# Patient Record
Sex: MALE | Race: WHITE | NOT HISPANIC OR LATINO | Employment: OTHER | URBAN - METROPOLITAN AREA
[De-identification: names, ages, dates, MRNs, and addresses within clinical notes are randomized per-mention and may not be internally consistent; named-entity substitution may affect disease eponyms.]

---

## 2019-10-23 ENCOUNTER — HOSPITAL ENCOUNTER (EMERGENCY)
Facility: HOSPITAL | Age: 64
Discharge: HOME | End: 2019-10-23
Attending: EMERGENCY MEDICINE | Admitting: EMERGENCY MEDICINE

## 2019-10-23 ENCOUNTER — APPOINTMENT (OUTPATIENT)
Dept: CT IMAGING | Facility: HOSPITAL | Age: 64
End: 2019-10-23

## 2019-10-23 ENCOUNTER — APPOINTMENT (OUTPATIENT)
Dept: GENERAL RADIOLOGY | Facility: HOSPITAL | Age: 64
End: 2019-10-23

## 2019-10-23 VITALS
TEMPERATURE: 97.1 F | SYSTOLIC BLOOD PRESSURE: 202 MMHG | DIASTOLIC BLOOD PRESSURE: 119 MMHG | HEART RATE: 100 BPM | OXYGEN SATURATION: 97 % | HEIGHT: 72 IN | RESPIRATION RATE: 18 BRPM | BODY MASS INDEX: 18.5 KG/M2 | WEIGHT: 136.6 LBS

## 2019-10-23 DIAGNOSIS — I71.40 ABDOMINAL AORTIC ANEURYSM (AAA) 3.0 CM TO 5.5 CM IN DIAMETER IN MALE (HCC): ICD-10-CM

## 2019-10-23 DIAGNOSIS — K59.00 CONSTIPATION, UNSPECIFIED CONSTIPATION TYPE: Primary | ICD-10-CM

## 2019-10-23 DIAGNOSIS — I10 HYPERTENSION, UNSPECIFIED TYPE: ICD-10-CM

## 2019-10-23 LAB
ALBUMIN SERPL-MCNC: 3.9 G/DL (ref 3.5–5.2)
ALBUMIN/GLOB SERPL: 1.3 G/DL
ALP SERPL-CCNC: 137 U/L (ref 39–117)
ALT SERPL W P-5'-P-CCNC: 7 U/L (ref 1–41)
ANION GAP SERPL CALCULATED.3IONS-SCNC: 12.8 MMOL/L (ref 5–15)
AST SERPL-CCNC: 6 U/L (ref 1–40)
BASOPHILS # BLD AUTO: 0.01 10*3/MM3 (ref 0–0.2)
BASOPHILS NFR BLD AUTO: 0.1 % (ref 0–1.5)
BILIRUB SERPL-MCNC: 0.4 MG/DL (ref 0.2–1.2)
BILIRUB UR QL STRIP: NEGATIVE
BUN BLD-MCNC: 16 MG/DL (ref 8–23)
BUN/CREAT SERPL: 16.5 (ref 7–25)
CALCIUM SPEC-SCNC: 8.9 MG/DL (ref 8.6–10.5)
CHLORIDE SERPL-SCNC: 100 MMOL/L (ref 98–107)
CLARITY UR: CLEAR
CO2 SERPL-SCNC: 23.2 MMOL/L (ref 22–29)
COLOR UR: YELLOW
CREAT BLD-MCNC: 0.97 MG/DL (ref 0.76–1.27)
D-LACTATE SERPL-SCNC: 1.9 MMOL/L (ref 0.5–2)
DEPRECATED RDW RBC AUTO: 43 FL (ref 37–54)
EOSINOPHIL # BLD AUTO: 0 10*3/MM3 (ref 0–0.4)
EOSINOPHIL NFR BLD AUTO: 0 % (ref 0.3–6.2)
ERYTHROCYTE [DISTWIDTH] IN BLOOD BY AUTOMATED COUNT: 14.5 % (ref 12.3–15.4)
GFR SERPL CREATININE-BSD FRML MDRD: 77 ML/MIN/1.73
GLOBULIN UR ELPH-MCNC: 3 GM/DL
GLUCOSE BLD-MCNC: 110 MG/DL (ref 65–99)
GLUCOSE UR STRIP-MCNC: NEGATIVE MG/DL
HCT VFR BLD AUTO: 49.1 % (ref 37.5–51)
HGB BLD-MCNC: 16.3 G/DL (ref 13–17.7)
HGB UR QL STRIP.AUTO: NEGATIVE
IMM GRANULOCYTES # BLD AUTO: 0.03 10*3/MM3 (ref 0–0.05)
IMM GRANULOCYTES NFR BLD AUTO: 0.3 % (ref 0–0.5)
KETONES UR QL STRIP: ABNORMAL
LEUKOCYTE ESTERASE UR QL STRIP.AUTO: NEGATIVE
LIPASE SERPL-CCNC: 28 U/L (ref 13–60)
LYMPHOCYTES # BLD AUTO: 0.46 10*3/MM3 (ref 0.7–3.1)
LYMPHOCYTES NFR BLD AUTO: 5.2 % (ref 19.6–45.3)
MCH RBC QN AUTO: 27.4 PG (ref 26.6–33)
MCHC RBC AUTO-ENTMCNC: 33.2 G/DL (ref 31.5–35.7)
MCV RBC AUTO: 82.7 FL (ref 79–97)
MONOCYTES # BLD AUTO: 0.37 10*3/MM3 (ref 0.1–0.9)
MONOCYTES NFR BLD AUTO: 4.2 % (ref 5–12)
NEUTROPHILS # BLD AUTO: 7.97 10*3/MM3 (ref 1.7–7)
NEUTROPHILS NFR BLD AUTO: 90.2 % (ref 42.7–76)
NITRITE UR QL STRIP: NEGATIVE
NRBC BLD AUTO-RTO: 0 /100 WBC (ref 0–0.2)
PH UR STRIP.AUTO: 5.5 [PH] (ref 5–8)
PLATELET # BLD AUTO: 183 10*3/MM3 (ref 140–450)
PMV BLD AUTO: 10.4 FL (ref 6–12)
POTASSIUM BLD-SCNC: 4.6 MMOL/L (ref 3.5–5.2)
PROT SERPL-MCNC: 6.9 G/DL (ref 6–8.5)
PROT UR QL STRIP: NEGATIVE
RBC # BLD AUTO: 5.94 10*6/MM3 (ref 4.14–5.8)
SODIUM BLD-SCNC: 136 MMOL/L (ref 136–145)
SP GR UR STRIP: >=1.03 (ref 1–1.03)
UROBILINOGEN UR QL STRIP: ABNORMAL
WBC NRBC COR # BLD: 8.84 10*3/MM3 (ref 3.4–10.8)

## 2019-10-23 PROCEDURE — 96374 THER/PROPH/DIAG INJ IV PUSH: CPT

## 2019-10-23 PROCEDURE — 99284 EMERGENCY DEPT VISIT MOD MDM: CPT

## 2019-10-23 PROCEDURE — 74018 RADEX ABDOMEN 1 VIEW: CPT

## 2019-10-23 PROCEDURE — 25010000002 IOPAMIDOL 61 % SOLUTION: Performed by: EMERGENCY MEDICINE

## 2019-10-23 PROCEDURE — 74177 CT ABD & PELVIS W/CONTRAST: CPT

## 2019-10-23 PROCEDURE — 96375 TX/PRO/DX INJ NEW DRUG ADDON: CPT

## 2019-10-23 PROCEDURE — 81003 URINALYSIS AUTO W/O SCOPE: CPT | Performed by: PHYSICIAN ASSISTANT

## 2019-10-23 PROCEDURE — 83605 ASSAY OF LACTIC ACID: CPT | Performed by: PHYSICIAN ASSISTANT

## 2019-10-23 PROCEDURE — 80053 COMPREHEN METABOLIC PANEL: CPT | Performed by: PHYSICIAN ASSISTANT

## 2019-10-23 PROCEDURE — 83690 ASSAY OF LIPASE: CPT | Performed by: PHYSICIAN ASSISTANT

## 2019-10-23 PROCEDURE — 96361 HYDRATE IV INFUSION ADD-ON: CPT

## 2019-10-23 PROCEDURE — 85025 COMPLETE CBC W/AUTO DIFF WBC: CPT | Performed by: PHYSICIAN ASSISTANT

## 2019-10-23 RX ORDER — SODIUM CHLORIDE 9 MG/ML
125 INJECTION, SOLUTION INTRAVENOUS CONTINUOUS
Status: DISCONTINUED | OUTPATIENT
Start: 2019-10-23 | End: 2019-10-23 | Stop reason: HOSPADM

## 2019-10-23 RX ORDER — LABETALOL HYDROCHLORIDE 5 MG/ML
10 INJECTION, SOLUTION INTRAVENOUS ONCE
Status: COMPLETED | OUTPATIENT
Start: 2019-10-23 | End: 2019-10-23

## 2019-10-23 RX ORDER — POLYETHYLENE GLYCOL 3350 17 G/17G
17 POWDER, FOR SOLUTION ORAL 2 TIMES DAILY
Qty: 10 PACKET | Refills: 0 | Status: SHIPPED | OUTPATIENT
Start: 2019-10-23 | End: 2019-10-28

## 2019-10-23 RX ORDER — ONDANSETRON 4 MG/1
4 TABLET, ORALLY DISINTEGRATING ORAL 4 TIMES DAILY PRN
Qty: 28 TABLET | Refills: 0 | Status: SHIPPED | OUTPATIENT
Start: 2019-10-23 | End: 2019-10-30

## 2019-10-23 RX ORDER — AMLODIPINE BESYLATE 10 MG/1
10 TABLET ORAL DAILY
Qty: 14 TABLET | Refills: 0 | Status: SHIPPED | OUTPATIENT
Start: 2019-10-23 | End: 2019-11-05 | Stop reason: SDUPTHER

## 2019-10-23 RX ORDER — SODIUM CHLORIDE 0.9 % (FLUSH) 0.9 %
10 SYRINGE (ML) INJECTION AS NEEDED
Status: DISCONTINUED | OUTPATIENT
Start: 2019-10-23 | End: 2019-10-23 | Stop reason: HOSPADM

## 2019-10-23 RX ADMIN — LABETALOL 20 MG/4 ML (5 MG/ML) INTRAVENOUS SYRINGE 10 MG: at 15:35

## 2019-10-23 RX ADMIN — LABETALOL 20 MG/4 ML (5 MG/ML) INTRAVENOUS SYRINGE 10 MG: at 17:36

## 2019-10-23 RX ADMIN — IOPAMIDOL 85 ML: 612 INJECTION, SOLUTION INTRAVENOUS at 16:09

## 2019-10-23 RX ADMIN — SODIUM CHLORIDE 125 ML/HR: 9 INJECTION, SOLUTION INTRAVENOUS at 16:28

## 2019-10-23 NOTE — ED TRIAGE NOTES
"Pt reports \"light\" bowel movement today but otherwise has not had bowel movement x1.5 weeks.   "

## 2019-10-23 NOTE — ED PROVIDER NOTES
Pt presents to the ED c/o constipation that began 1.5 weeks ago. Pt is also c/o intermittent, crampy abdominal pain. Pt denies abdominal distension, headache, chest pain, SOA, and focal numbness or weakness. Pt reports trying both Miralax and Exlax this morning and throwing up after each. Pt reports consuming normal amounts of solids over the past 1.5 weeks. Pt states he drinks coffee throughout the day but does not consume much water. Pt reports being a smoker but denies EtOH use.     On exam, appears older then stated age, regular rate and rhythm, lungs CTAB, abdomen soft, nontender and nondistended, good bowel sound, no BLE edema, intact distal pulses    Workup reviewed. I agree with the plan to obtain CT A/P, XR abdomen and blood work for further evaluation.          1701: Spoke with  (radiology) who regarding CT A/P results (see final read)    1719: Rechecked pt who is resting comfortably and in NAD. Informed pt of all negative results, including CT A/P. Discussed plan to try Miralax for constipation. Discussed plan to discharge. RTER precautions given. Pt understands and agrees with the plan, all questions answered.        Attestation:    The MIKEY and I have discussed this patient's history, physical exam, and treatment plan.  I have reviewed the documentation and personally had a face to face interaction with the patient. I affirm the documentation and agree with the treatment and plan.  The attached note describes my personal findings.    Documentation assistance provided by christy Butler for . Information recorded by the christy was done at my direction and has been verified and validated by me.          Arleen Butler  10/23/19 1725       Matthew Artis MD  10/23/19 1931

## 2019-10-23 NOTE — DISCHARGE INSTRUCTIONS
Try taking Miralax two times a day first. If you have not had a bowel movement after a few days, increase to three times/day. Follow up with primary care for blood pressure. Follow up with Dr. Dey for abdominal aneurysm. Return to the ER with any concerning or worsening symptoms.

## 2019-10-23 NOTE — ED PROVIDER NOTES
"EMERGENCY DEPARTMENT ENCOUNTER    Room Number:  24/24  PCP: System, Provider Not In  Historian: Patient and pt's wife      HPI:  Chief Complaint: Constipation  Context: Kieran Hdez is a 64 y.o. male who presents to the ED c/o constipation that began about \"a week and a half ago\". He admits to lightheaded s/t being hungry and slight abd pain. He denies nausea. He states that he has had small BM's but has not have a nml BM in 1.5 weeks. Pt's wife states the pt did take some Miralax today but ended up vomiting it up. She states the pt also took some Ex-Lax and vomited that up this morning. He denies being on BP medication. Pt is a smoker and he sates that he may smoke a pack or less a day.    Pain Location: n/a  Radiation: n/a  Character: Constipation  Duration: week and a half  Severity: mild  Progression: unchanged  Aggravating Factors: none  Alleviating Factors: none      ALLERGIES  Patient has no known allergies.    PAST MEDICAL HISTORY  Active Ambulatory Problems     Diagnosis Date Noted   • No Active Ambulatory Problems     Resolved Ambulatory Problems     Diagnosis Date Noted   • No Resolved Ambulatory Problems     No Additional Past Medical History       PAST SURGICAL HISTORY  History reviewed. No pertinent surgical history.    FAMILY HISTORY  History reviewed. No pertinent family history.    SOCIAL HISTORY  Social History     Socioeconomic History   • Marital status:      Spouse name: Not on file   • Number of children: Not on file   • Years of education: Not on file   • Highest education level: Not on file   Tobacco Use   • Smoking status: Current Every Day Smoker     Packs/day: 1.00     Types: Cigarettes   • Smokeless tobacco: Never Used   Substance and Sexual Activity   • Alcohol use: No     Frequency: Never   • Drug use: No   • Sexual activity: Defer           REVIEW OF SYSTEMS  Review of Systems   Constitutional: Negative for activity change, appetite change and fever.   HENT: Negative for congestion " and sore throat.    Eyes: Negative.    Respiratory: Positive for wheezing (when sleeping). Negative for cough and shortness of breath.    Cardiovascular: Negative for chest pain and leg swelling.   Gastrointestinal: Positive for abdominal pain and constipation. Negative for diarrhea, nausea and vomiting.   Endocrine: Negative.    Genitourinary: Negative for decreased urine volume and dysuria.   Musculoskeletal: Negative for neck pain.   Skin: Negative for rash and wound.   Allergic/Immunologic: Negative.    Neurological: Positive for light-headedness. Negative for weakness, numbness and headaches.   Hematological: Negative.    Psychiatric/Behavioral: Negative.    All other systems reviewed and are negative.          PHYSICAL EXAM  ED Triage Vitals   Temp Heart Rate Resp BP SpO2   10/23/19 1359 10/23/19 1359 10/23/19 1359 10/23/19 1415 10/23/19 1359   97.1 °F (36.2 °C) 108 18 (!) 235/137 97 %      Temp src Heart Rate Source Patient Position BP Location FiO2 (%)   10/23/19 1359 10/23/19 1359 10/23/19 1415 10/23/19 1415 --   Tympanic Monitor Sitting Right arm      Physical Exam   Constitutional: He is oriented to person, place, and time. No distress.   HENT:   Head: Normocephalic and atraumatic.   Mouth/Throat: Mucous membranes are dry.   Eyes: EOM are normal. Pupils are equal, round, and reactive to light.   Neck: Normal range of motion. Neck supple.   Cardiovascular: Normal rate, regular rhythm and normal heart sounds.   Pulmonary/Chest: Effort normal and breath sounds normal. No respiratory distress.   Abdominal: Soft. There is no tenderness. There is no rebound and no guarding.   Musculoskeletal: Normal range of motion. He exhibits no edema.   Neurological: He is alert and oriented to person, place, and time. He has normal sensation and normal strength.   Skin: Skin is warm and dry.   Psychiatric: Mood and affect normal.   Nursing note and vitals reviewed.          LAB RESULTS  Recent Results (from the past 24  hour(s))   Comprehensive Metabolic Panel    Collection Time: 10/23/19  3:00 PM   Result Value Ref Range    Glucose 110 (H) 65 - 99 mg/dL    BUN 16 8 - 23 mg/dL    Creatinine 0.97 0.76 - 1.27 mg/dL    Sodium 136 136 - 145 mmol/L    Potassium 4.6 3.5 - 5.2 mmol/L    Chloride 100 98 - 107 mmol/L    CO2 23.2 22.0 - 29.0 mmol/L    Calcium 8.9 8.6 - 10.5 mg/dL    Total Protein 6.9 6.0 - 8.5 g/dL    Albumin 3.90 3.50 - 5.20 g/dL    ALT (SGPT) 7 1 - 41 U/L    AST (SGOT) 6 1 - 40 U/L    Alkaline Phosphatase 137 (H) 39 - 117 U/L    Total Bilirubin 0.4 0.2 - 1.2 mg/dL    eGFR Non African Amer 77 >60 mL/min/1.73    Globulin 3.0 gm/dL    A/G Ratio 1.3 g/dL    BUN/Creatinine Ratio 16.5 7.0 - 25.0    Anion Gap 12.8 5.0 - 15.0 mmol/L   Lipase    Collection Time: 10/23/19  3:00 PM   Result Value Ref Range    Lipase 28 13 - 60 U/L   CBC Auto Differential    Collection Time: 10/23/19  3:00 PM   Result Value Ref Range    WBC 8.84 3.40 - 10.80 10*3/mm3    RBC 5.94 (H) 4.14 - 5.80 10*6/mm3    Hemoglobin 16.3 13.0 - 17.7 g/dL    Hematocrit 49.1 37.5 - 51.0 %    MCV 82.7 79.0 - 97.0 fL    MCH 27.4 26.6 - 33.0 pg    MCHC 33.2 31.5 - 35.7 g/dL    RDW 14.5 12.3 - 15.4 %    RDW-SD 43.0 37.0 - 54.0 fl    MPV 10.4 6.0 - 12.0 fL    Platelets 183 140 - 450 10*3/mm3    Neutrophil % 90.2 (H) 42.7 - 76.0 %    Lymphocyte % 5.2 (L) 19.6 - 45.3 %    Monocyte % 4.2 (L) 5.0 - 12.0 %    Eosinophil % 0.0 (L) 0.3 - 6.2 %    Basophil % 0.1 0.0 - 1.5 %    Immature Grans % 0.3 0.0 - 0.5 %    Neutrophils, Absolute 7.97 (H) 1.70 - 7.00 10*3/mm3    Lymphocytes, Absolute 0.46 (L) 0.70 - 3.10 10*3/mm3    Monocytes, Absolute 0.37 0.10 - 0.90 10*3/mm3    Eosinophils, Absolute 0.00 0.00 - 0.40 10*3/mm3    Basophils, Absolute 0.01 0.00 - 0.20 10*3/mm3    Immature Grans, Absolute 0.03 0.00 - 0.05 10*3/mm3    nRBC 0.0 0.0 - 0.2 /100 WBC   Lactic Acid, Plasma    Collection Time: 10/23/19  3:50 PM   Result Value Ref Range    Lactate 1.9 0.5 - 2.0 mmol/L   Urinalysis With  Microscopic If Indicated (No Culture) - Urine, Clean Catch    Collection Time: 10/23/19  4:34 PM   Result Value Ref Range    Color, UA Yellow Yellow, Straw    Appearance, UA Clear Clear    pH, UA 5.5 5.0 - 8.0    Specific Gravity, UA >=1.030 1.005 - 1.030    Glucose, UA Negative Negative    Ketones, UA 15 mg/dL (1+) (A) Negative    Bilirubin, UA Negative Negative    Blood, UA Negative Negative    Protein, UA Negative Negative    Leuk Esterase, UA Negative Negative    Nitrite, UA Negative Negative    Urobilinogen, UA 0.2 E.U./dL 0.2 - 1.0 E.U./dL       I ordered the above labs and reviewed the results        RADIOLOGY  Ct Abdomen Pelvis With Contrast    Result Date: 10/23/2019  Narrative: CT ABDOMEN PELVIS W CONTRAST-  INDICATIONS: Abdominal pain  TECHNIQUE: Radiation dose reduction techniques were utilized, including automated exposure control and exposure modulation based on body size. Enhanced ABDOMEN AND PELVIS CT  COMPARISON: 01/18/2005  FINDINGS:  A hepatic cyst is demonstrated. A left hepatic subcentimeter low density too small to characterize, interval follow-up recommended characterize change.  Areas of cortical thinning in the bilateral renal cortices may be result of prior infarcts or infections.  Mild nonspecific thickening of the adrenal glands.  Otherwise unremarkable appearance of the liver, spleen, adrenal glands, pancreas, kidneys, bladder.  No bowel obstruction or abnormal bowel thickening is identified. Appendix does not appear inflamed. Mild colonic fecal retention.  No free intraperitoneal gas or free fluid.  Scattered small mesenteric and para-aortic lymph nodes are seen that are not significant by size criteria.  Abdominal aorta is aneurysmal, 3.6 x 3.8 cm on image 46 of series 1 (previously 2.0 cm), with mural plaque. Aortic and other arterial calcifications are present.  The lung bases show pleural-based densities of the right lower lobe that may be focal atelectasis or pulmonary nodules, 4  mm on image 5 of series 1, 1.1 cm on image 3, recommend three-month follow-up chest CT characterize change (the larger focus could be further evaluated with positron emission tomography if indicated).  Degenerative changes are seen in the spine. No acute fracture is identified.        Impression:   1. No acute inflammatory process of bowel is identified, follow up as indications persist. 2. No obstructive uropathy. Subcentimeter hepatic low density too small to characterize. 3. Abdominal aortic aneurysm. 4. Pleural-based nodular densities of the right lower lobe, follow-up recommended.  Discussed by telephone with Dr. Artis at 1700, 10/23/2019.  This report was finalized on 10/23/2019 5:01 PM by Dr. Dk Lima M.D.      Xr Abdomen Kub    Result Date: 10/23/2019  Narrative: XR ABDOMEN KUB-  INDICATIONS: Abdominal pain  TECHNIQUE: Supine views of the abdomen  COMPARISON: None available  FINDINGS:   The bowel gas pattern is nonobstructive. No supine evidence for free intraperitoneal gas. Moderate colonic fecal retention, kidneys are partly obscured. No definite nephrolithiasis. Nonspecific pelvic soft tissue calcifications. If there is further clinical concern, CT can be obtained for further examination.      Impression:  As described.  This report was finalized on 10/23/2019 3:47 PM by Dr. Dk Lima M.D.        I ordered the above noted radiological studies and reviewed the images on the PACS system.         MEDICATIONS GIVEN IN ER  Medications   labetalol (NORMODYNE,TRANDATE) injection 10 mg (10 mg Intravenous Given 10/23/19 1535)   iopamidol (ISOVUE-300) 61 % injection 100 mL (85 mL Intravenous Given by Other 10/23/19 1609)   labetalol (NORMODYNE,TRANDATE) injection 10 mg (10 mg Intravenous Given 10/23/19 1736)         PROCEDURES  Procedures      PROGRESS AND CONSULTS     1540- Reviewed pt's history and workup with Dr. Artis (ER physician). After a bedside evaluation, Dr. Artis agrees with  "the plan of care.    1711- Rechecked pt who is resting comfortably in NAD. Informed pt of the lab and imaging results. D/w pt the plan to DC with instructions to f/u with vascular surgery. Informed the pt that BP medication will be prescribed but f/u with a PCP will be necessary. Pt understands and agrees with plan. All questions answered.              Disposition vitals:  BP (!) 202/119   Pulse 100   Temp 97.1 °F (36.2 °C) (Tympanic)   Resp 18   Ht 182.9 cm (72\")   Wt 62 kg (136 lb 9.6 oz)   SpO2 97%   BMI 18.53 kg/m²           DIAGNOSIS  Final diagnoses:   Constipation, unspecified constipation type   Hypertension, unspecified type   Abdominal aortic aneurysm (AAA) 3.0 cm to 5.5 cm in diameter in male (CMS/HCC)           DISPOSITION  DISCHARGE    Patient discharged in stable condition.    Reviewed implications of results, diagnosis, meds, responsibility to follow up, warning signs and symptoms of possible worsening, potential complications and reasons to return to ER.    Patient/Family voiced understanding of above instructions.    Discussed plan for discharge, as there is no emergent indication for admission. Patient referred to primary care provider for BP management due to today's BP. Pt/family is agreeable and understands need for follow up and repeat testing.  Pt is aware that discharge does not mean that nothing is wrong but it indicates no emergency is present that requires admission and they must continue care with follow-up as given below or physician of their choice.     FOLLOW-UP  PATIENT LIAISON [facility] [id number] [phone]  Call in 1 day  To establish primary care and for high blood pressure    Andrei De La Garza MD  [address]  [place] [id number] [phone]    Call in 2 days  For follow up on abdominal aortic aneursym    [facility]  [address] [address] [phone]    As needed, If " symptoms worsen         Medication List      New Prescriptions    amLODIPine 10 MG tablet  Commonly known as:  NORVASC  Take 1 tablet by mouth Daily for 14 days.     ondansetron ODT 4 MG disintegrating tablet  Commonly known as:  ZOFRAN-ODT  Take 1 tablet by mouth 4 (Four) Times a Day As Needed for Nausea or   Vomiting for up to 7 days.     polyethylene glycol packet  Commonly known as:  MIRALAX  Take 17 g by mouth 2 (Two) Times a Day for 5 days.            Documentation assistance provided by christy Pena for Ms. Anny Rodgers PA-C.  Information recorded by the christy was done at my direction and has been verified and validated by me.         Dionna Pena  10/23/19 1727       Anny Rodgers PA-C  10/24/19 0005

## 2019-11-05 ENCOUNTER — OFFICE VISIT (OUTPATIENT)
Dept: FAMILY MEDICINE CLINIC | Facility: CLINIC | Age: 64
End: 2019-11-05

## 2019-11-05 VITALS
SYSTOLIC BLOOD PRESSURE: 158 MMHG | BODY MASS INDEX: 18.28 KG/M2 | RESPIRATION RATE: 16 BRPM | WEIGHT: 135 LBS | DIASTOLIC BLOOD PRESSURE: 90 MMHG | HEART RATE: 72 BPM | TEMPERATURE: 97.7 F | HEIGHT: 72 IN | OXYGEN SATURATION: 98 %

## 2019-11-05 DIAGNOSIS — I10 ESSENTIAL HYPERTENSION: ICD-10-CM

## 2019-11-05 DIAGNOSIS — Z00.00 HEALTHCARE MAINTENANCE: Primary | ICD-10-CM

## 2019-11-05 DIAGNOSIS — Z79.899 HIGH RISK MEDICATION USE: ICD-10-CM

## 2019-11-05 DIAGNOSIS — Z00.00 HEALTH CARE MAINTENANCE: ICD-10-CM

## 2019-11-05 DIAGNOSIS — Z86.010 HISTORY OF COLON POLYPS: ICD-10-CM

## 2019-11-05 DIAGNOSIS — E78.5 HYPERLIPIDEMIA, UNSPECIFIED HYPERLIPIDEMIA TYPE: Primary | ICD-10-CM

## 2019-11-05 DIAGNOSIS — F17.210 CIGARETTE SMOKER: ICD-10-CM

## 2019-11-05 DIAGNOSIS — K59.00 CONSTIPATION, UNSPECIFIED CONSTIPATION TYPE: ICD-10-CM

## 2019-11-05 DIAGNOSIS — I71.40 ABDOMINAL AORTIC ANEURYSM (AAA) WITHOUT RUPTURE (HCC): ICD-10-CM

## 2019-11-05 PROCEDURE — 99204 OFFICE O/P NEW MOD 45 MIN: CPT | Performed by: FAMILY MEDICINE

## 2019-11-05 RX ORDER — AMLODIPINE BESYLATE 10 MG/1
10 TABLET ORAL DAILY
Qty: 90 TABLET | Refills: 3 | Status: SHIPPED | OUTPATIENT
Start: 2019-11-05 | End: 2020-04-04 | Stop reason: HOSPADM

## 2019-11-05 RX ORDER — INFLUENZA A VIRUS A/SINGAPORE/GP1908/2015 IVR-180 (H1N1) ANTIGEN (MDCK CELL DERIVED, PROPIOLACTONE INACTIVATED), INFLUENZA A VIRUS A/NORTH CAROLINA/04/2016 (H3N2) HEMAGGLUTININ ANTIGEN (MDCK CELL DERIVED, PROPIOLACTONE INACTIVATED), INFLUENZA B VIRUS B/IOWA/06/2017 HEMAGGLUTININ ANTIGEN (MDCK CELL DERIVED, PROPIOLACTONE INACTIVATED), INFLUENZA B VIRUS B/SINGAPORE/INFTT-16-0610/2016 HEMAGGLUTININ ANTIGEN (MDCK CELL DERIVED, PROPIOLACTONE INACTIVATED) 15; 15; 15; 15 UG/.5ML; UG/.5ML; UG/.5ML; UG/.5ML
INJECTION, SUSPENSION INTRAMUSCULAR
Refills: 0 | COMMUNITY
Start: 2019-10-10 | End: 2019-11-05

## 2019-11-05 RX ORDER — LISINOPRIL 10 MG/1
10 TABLET ORAL DAILY
Qty: 90 TABLET | Refills: 3 | Status: SHIPPED | OUTPATIENT
Start: 2019-11-05 | End: 2020-04-04 | Stop reason: HOSPADM

## 2019-11-05 RX ORDER — SENNA AND DOCUSATE SODIUM 50; 8.6 MG/1; MG/1
2 TABLET, FILM COATED ORAL DAILY
Qty: 60 TABLET | Refills: 5
Start: 2019-11-05

## 2019-11-05 NOTE — PROGRESS NOTES
HPI  Kieran Hdez is a 64 y.o. male who is here for follow up after recent emergency room visit for constipation.  Went to emergency room and was noted to have extremely elevated blood pressure and was started on amlodipine.  Patient also is interested in discontinuing cigarette use and this was discussed especially related to known aneurysm etc.  Previously seen by Dr. Ronquillo but greater than 5 years ago.  Of note had colonoscopy 5 years ago by gastroenterologist and some polyps were noted including with some mild dysplasia.  Is due for follow-up colonoscopy which will be scheduled.  Also discussed current recommendations for CT screening of the lungs.  Some weight loss over the last year also noted.  Reportedly was told cholesterol levels somewhat elevated in the past.  Reviewed old history as best possible with computer changes.  Also health history form completed and reviewed.  Wife reports one other previous episodes of severe constipation but is not a usual problem.  Reviewing old records apparently patient was on lisinopril sometime in the past for borderline blood pressure elevation.      Review of Systems   Constitutional: Positive for activity change, appetite change and unexpected weight change.   Gastrointestinal: Positive for constipation.   All other systems reviewed and are negative.        History reviewed. No pertinent past medical history.    Past Surgical History:   Procedure Laterality Date   • HERNIA REPAIR         Family History   Problem Relation Age of Onset   • Emphysema Mother    • COPD Mother    • Heart attack Father    • COPD Brother    • Emphysema Brother        Social History     Socioeconomic History   • Marital status:      Spouse name: Not on file   • Number of children: Not on file   • Years of education: Not on file   • Highest education level: Not on file   Tobacco Use   • Smoking status: Current Every Day Smoker     Packs/day: 1.00     Types: Cigarettes   • Smokeless tobacco:  Never Used   Substance and Sexual Activity   • Alcohol use: No     Frequency: Never   • Drug use: No   • Sexual activity: Yes     Partners: Female         Physical Exam   Constitutional: He is oriented to person, place, and time. He appears well-developed and well-nourished. No distress.   HENT:   Head: Normocephalic and atraumatic.   Nose: Nose normal.   Mouth/Throat: Oropharynx is clear and moist.   Eyes: Conjunctivae and EOM are normal. Pupils are equal, round, and reactive to light. No scleral icterus.   Neck: Normal range of motion. Neck supple. No tracheal deviation present.   Cardiovascular: Normal rate, regular rhythm and normal heart sounds.   Pulmonary/Chest: Effort normal. No respiratory distress.   Scattered rales rhonchi and wheezes noted   Abdominal: Soft. He exhibits no distension and no mass. There is no hepatosplenomegaly. There is no tenderness. No hernia.       Musculoskeletal: Normal range of motion. He exhibits no edema or deformity.   Lymphadenopathy:     He has no cervical adenopathy.   Neurological: He is alert and oriented to person, place, and time. He exhibits normal muscle tone. Coordination normal.   Skin: Skin is warm and dry.   Psychiatric: He has a normal mood and affect. His behavior is normal. Judgment and thought content normal.   Nursing note and vitals reviewed.        Assessment/Plan    Kieran was seen today for hypertension, nicotine dependence and constipation.    Diagnoses and all orders for this visit:    Hyperlipidemia, unspecified hyperlipidemia type  -     Lipid Panel    Essential hypertension  -     lisinopril (PRINIVIL,ZESTRIL) 10 MG tablet; Take 1 tablet by mouth Daily.  -     amLODIPine (NORVASC) 10 MG tablet; Take 1 tablet by mouth Daily.    High risk medication use  -     Ambulatory Referral to Multi-Disciplinary Clinic    Cigarette smoker  -     varenicline (CHANTIX STARTING MONTH PAK) 0.5 MG X 11 & 1 MG X 42 tablet; Take 0.5 mg one daily on days 1-3 and and 0.5 mg  twice daily on days 4-7.Then 1 mg twice daily for a total of 12 weeks.    Abdominal aortic aneurysm (AAA) without rupture (CMS/HCC)  -     varenicline (CHANTIX STARTING MONTH PAK) 0.5 MG X 11 & 1 MG X 42 tablet; Take 0.5 mg one daily on days 1-3 and and 0.5 mg twice daily on days 4-7.Then 1 mg twice daily for a total of 12 weeks.    Constipation, unspecified constipation type  -     senna-docusate sodium (SENOKOT-S) 8.6-50 MG tablet; Take 2 tablets by mouth Daily.  -     Ambulatory Referral For Screening Colonoscopy    History of colon polyps  -     Ambulatory Referral For Screening Colonoscopy    Health care maintenance  -     Hepatitis C Antibody      Patient recently seen in emergency room for constipation.  Has been using MiraLAX daily with incomplete relief.  Discussed trial of over-the-counter Senokot S 2 tablets daily.  Also his past due for screening colonoscopy, polyps were noted 5 years ago.  Will refer back to gastroenterologist as noted above.  Continues to smoke and wishes to try discontinuing again.  Was given Chantix in the past but reports stopped because of constipation?  Especially with aneurysm this is strongly recommended.  Also blood pressure control and recheck cholesterol.  Will add blood pressure medicine because blood pressure remains borderline elevated at 158/90.  Monitor blood pressure and recheck in office in 1 month.  Also renew Chantix at that time.  Will schedule for screening chest CT because of smoking history and abdominal aneurysm.  Consider other routine screening at next appointment with further discussion.    This note includes information entered using a voice recognition dictation system.  Though reviewed, some nonsensible errors may remain.

## 2019-11-06 DIAGNOSIS — R91.1 LUNG NODULE SEEN ON IMAGING STUDY: Primary | ICD-10-CM

## 2019-11-06 LAB
CHOLEST SERPL-MCNC: 161 MG/DL (ref 100–199)
HDLC SERPL-MCNC: 34 MG/DL
LDLC SERPL CALC-MCNC: 109 MG/DL (ref 0–99)
TRIGL SERPL-MCNC: 90 MG/DL (ref 0–149)
VLDLC SERPL CALC-MCNC: 18 MG/DL (ref 5–40)

## 2019-11-06 NOTE — PROGRESS NOTES
Received referral for low-dose lung cancer screening CT for screening.  In review of the patient history found recent CT of abdomen with a 1.1 cm nodule reported in right lower lobe visible on scan.  Called to discuss with Dr. Guadalupe.  Patient will need a diagnostic CT of chest to evaluate the lungs with this finding.  Will cancel order for low-dose lung cancer screening CT and schedule diagnostic CT Chest without contrast for further evaluation per request of Dr. Guadalupe.  Called Mr. Hdez and explained that he will need a regular CT chest based on findings on the previous abdominal CT and he verbalizes understanding.

## 2019-11-07 ENCOUNTER — HOSPITAL ENCOUNTER (OUTPATIENT)
Dept: PET IMAGING | Facility: HOSPITAL | Age: 64
Discharge: HOME | End: 2019-11-07
Admitting: FAMILY MEDICINE

## 2019-11-07 DIAGNOSIS — R91.1 LUNG NODULE SEEN ON IMAGING STUDY: ICD-10-CM

## 2019-11-07 PROCEDURE — 71250 CT THORAX DX C-: CPT

## 2019-11-08 LAB
HCV AB S/CO SERPL IA: <0.1 S/CO RATIO (ref 0–0.9)
Lab: NORMAL
WRITTEN AUTHORIZATION: NORMAL

## 2019-12-05 ENCOUNTER — OFFICE VISIT (OUTPATIENT)
Dept: FAMILY MEDICINE CLINIC | Facility: CLINIC | Age: 64
End: 2019-12-05

## 2019-12-05 VITALS
TEMPERATURE: 97.5 F | WEIGHT: 132 LBS | HEART RATE: 88 BPM | HEIGHT: 72 IN | DIASTOLIC BLOOD PRESSURE: 70 MMHG | OXYGEN SATURATION: 97 % | BODY MASS INDEX: 17.88 KG/M2 | SYSTOLIC BLOOD PRESSURE: 120 MMHG

## 2019-12-05 DIAGNOSIS — Z86.010 HISTORY OF COLON POLYPS: ICD-10-CM

## 2019-12-05 DIAGNOSIS — F17.210 CIGARETTE SMOKER: ICD-10-CM

## 2019-12-05 DIAGNOSIS — I70.90 ASVD (ARTERIOSCLEROTIC VASCULAR DISEASE): Primary | ICD-10-CM

## 2019-12-05 DIAGNOSIS — R63.4 WEIGHT LOSS: ICD-10-CM

## 2019-12-05 DIAGNOSIS — I10 ESSENTIAL HYPERTENSION: ICD-10-CM

## 2019-12-05 PROCEDURE — 99213 OFFICE O/P EST LOW 20 MIN: CPT | Performed by: FAMILY MEDICINE

## 2019-12-05 RX ORDER — ATORVASTATIN CALCIUM 10 MG/1
10 TABLET, FILM COATED ORAL DAILY
Qty: 90 TABLET | Refills: 3 | Status: SHIPPED | OUTPATIENT
Start: 2019-12-05 | End: 2020-04-04 | Stop reason: HOSPADM

## 2019-12-05 NOTE — PROGRESS NOTES
HPI  Kieran Hdez is a 64 y.o. male who is here for follow up of hypertension and also aortic aneurysm.  Was recently seen by vascular surgeon who recommended starting statin therapy despite cholesterol levels.  Apparently his father had high cholesterol and took medicine for many years but  in his 90s after falling and breaking his neck.  Wife is concerned about continued weight loss but apparently just has very little appetite especially in the mornings.  All of this was discussed.  Patient did have colon polyps removed in  and is scheduled for follow-up colonoscopy next month per gastroenterologist.  Was having some constipation issues but these have resolved.  Remains on Chantix and again strongly encouraged to remain off cigarettes.    Review of Systems   Constitutional: Positive for appetite change and unexpected weight change.   All other systems reviewed and are negative.        No past medical history on file.    Past Surgical History:   Procedure Laterality Date   • HERNIA REPAIR         Family History   Problem Relation Age of Onset   • Emphysema Mother    • COPD Mother    • Heart attack Father    • COPD Brother    • Emphysema Brother        Social History     Socioeconomic History   • Marital status:      Spouse name: Not on file   • Number of children: Not on file   • Years of education: Not on file   • Highest education level: Not on file   Tobacco Use   • Smoking status: Current Every Day Smoker     Packs/day: 1.00     Types: Cigarettes   • Smokeless tobacco: Never Used   Substance and Sexual Activity   • Alcohol use: No     Frequency: Never   • Drug use: No   • Sexual activity: Yes     Partners: Female         Physical Exam   Constitutional: He is oriented to person, place, and time. He appears well-developed. No distress.   HENT:   Head: Normocephalic.   Nose: Nose normal.   Mouth/Throat: Oropharynx is clear and moist.   Eyes: Conjunctivae and EOM are normal. Pupils are equal, round, and  reactive to light. No scleral icterus.   Neck: Normal range of motion. No thyromegaly present.   Cardiovascular: Normal rate and regular rhythm.   Pulmonary/Chest: Effort normal and breath sounds normal.   Abdominal: Soft. He exhibits no distension and no mass. There is no tenderness.   Musculoskeletal: Normal range of motion. He exhibits no edema or deformity.   Lymphadenopathy:     He has no cervical adenopathy.   Neurological: He is alert and oriented to person, place, and time. He exhibits normal muscle tone. Coordination normal.   Skin: Skin is dry. No pallor.   Psychiatric: He has a normal mood and affect. His behavior is normal. Judgment and thought content normal.         Assessment/Plan    Kieran was seen today for hyperlipidemia.    Diagnoses and all orders for this visit:    ASVD (arteriosclerotic vascular disease)  -     atorvastatin (LIPITOR) 10 MG tablet; Take 1 tablet by mouth Daily.    Essential hypertension    Cigarette smoker    Weight loss    History of colon polyps    Patient here for follow-up especially of hypertension which seems to be under good control.  Recently seen by vascular surgeon regarding aortic aneurysm.  Strongly encouraged to remain off cigarettes and will start low-dose statin therapy as recommended by vascular surgeon.  Encouraged food intake and wife is to monitor weight closely.  Is scheduled for follow-up colonoscopy next month.  Recheck cholesterol weight and blood pressure in 3 months.    This note includes information entered using a voice recognition dictation system.  Though reviewed, some nonsensible errors may remain.

## 2020-01-07 ENCOUNTER — TELEPHONE (OUTPATIENT)
Dept: FAMILY MEDICINE CLINIC | Facility: CLINIC | Age: 65
End: 2020-01-07

## 2020-01-07 DIAGNOSIS — F17.210 CIGARETTE NICOTINE DEPENDENCE WITHOUT COMPLICATION: Primary | ICD-10-CM

## 2020-01-07 RX ORDER — VARENICLINE TARTRATE 1 MG/1
1 TABLET, FILM COATED ORAL 2 TIMES DAILY
Qty: 60 TABLET | Refills: 4 | Status: SHIPPED | OUTPATIENT
Start: 2020-01-07 | End: 2020-03-05

## 2020-01-07 NOTE — TELEPHONE ENCOUNTER
PT NEEDS A NEW SCRIPT FOR CHANTIX [facility]ENT [facility]    [id number] - [place], [place] - [address] AT [facility] [phone] [facility] [phone] FX

## 2020-03-05 ENCOUNTER — OFFICE VISIT (OUTPATIENT)
Dept: FAMILY MEDICINE CLINIC | Facility: CLINIC | Age: 65
End: 2020-03-05

## 2020-03-05 VITALS
BODY MASS INDEX: 18.56 KG/M2 | WEIGHT: 137 LBS | TEMPERATURE: 97.7 F | HEIGHT: 72 IN | HEART RATE: 71 BPM | OXYGEN SATURATION: 98 % | RESPIRATION RATE: 20 BRPM | DIASTOLIC BLOOD PRESSURE: 72 MMHG | SYSTOLIC BLOOD PRESSURE: 118 MMHG

## 2020-03-05 DIAGNOSIS — Z79.899 HIGH RISK MEDICATION USE: ICD-10-CM

## 2020-03-05 DIAGNOSIS — F17.210 CIGARETTE SMOKER: ICD-10-CM

## 2020-03-05 DIAGNOSIS — I10 ESSENTIAL HYPERTENSION: Primary | ICD-10-CM

## 2020-03-05 DIAGNOSIS — E78.5 HYPERLIPIDEMIA, UNSPECIFIED HYPERLIPIDEMIA TYPE: ICD-10-CM

## 2020-03-05 PROCEDURE — 99213 OFFICE O/P EST LOW 20 MIN: CPT | Performed by: FAMILY MEDICINE

## 2020-03-05 NOTE — PROGRESS NOTES
HPI  Kieran Hdez is a 64 y.o. male who is here for follow up of hypertension hyperlipidemia and cigarette use.  Unfortunately continues to smoke but has decreased significantly.  Discussed importance of discontinuing cigarette use especially risks of heart attack strokes etc. as well as lung diseases.  Some weight gain noted and encouraged.      Review of Systems   All other systems reviewed and are negative.        No past medical history on file.    Past Surgical History:   Procedure Laterality Date   • HERNIA REPAIR         Family History   Problem Relation Age of Onset   • Emphysema Mother    • COPD Mother    • Heart attack Father    • COPD Brother    • Emphysema Brother        Social History     Socioeconomic History   • Marital status:      Spouse name: Not on file   • Number of children: Not on file   • Years of education: Not on file   • Highest education level: Not on file   Tobacco Use   • Smoking status: Current Every Day Smoker     Packs/day: 1.00     Types: Cigarettes   • Smokeless tobacco: Never Used   Substance and Sexual Activity   • Alcohol use: No     Frequency: Never   • Drug use: No   • Sexual activity: Yes     Partners: Female         Physical Exam   Constitutional: He is oriented to person, place, and time. He appears well-developed and well-nourished. No distress.   HENT:   Head: Normocephalic.   Eyes: Pupils are equal, round, and reactive to light. EOM are normal.   Neck: Normal range of motion.   Cardiovascular: Normal rate and regular rhythm.   Pulmonary/Chest: Effort normal. No respiratory distress.   Musculoskeletal: Normal range of motion.   Neurological: He is alert and oriented to person, place, and time.   Skin: Skin is warm and dry.   Psychiatric: He has a normal mood and affect. His behavior is normal. Judgment and thought content normal.   Nursing note and vitals reviewed.        Assessment/Plan    Kieran was seen today for hypertension.    Diagnoses and all orders for this  visit:    Essential hypertension  -     Comprehensive Metabolic Panel; Future    Hyperlipidemia, unspecified hyperlipidemia type  -     Lipid Panel; Future    High risk medication use  -     Comprehensive Metabolic Panel; Future    Cigarette smoker      Patient here for routine follow-up of above-noted medical problems.  Especially hypertension which is much better controlled on current regimen.  Again strongly recommend discontinuing cigarette use.  Return to office next week to recheck lipid panel after starting atorvastatin 3 months ago.  Otherwise recommend follow-up appointment in 3 months including Medicare wellness evaluation.    This note includes information entered using a voice recognition dictation system.  Though reviewed, some nonsensible errors may remain.

## 2020-03-10 ENCOUNTER — RESULTS ENCOUNTER (OUTPATIENT)
Dept: FAMILY MEDICINE CLINIC | Facility: CLINIC | Age: 65
End: 2020-03-10

## 2020-03-10 DIAGNOSIS — E78.5 HYPERLIPIDEMIA, UNSPECIFIED HYPERLIPIDEMIA TYPE: ICD-10-CM

## 2020-03-10 DIAGNOSIS — Z79.899 HIGH RISK MEDICATION USE: ICD-10-CM

## 2020-03-10 DIAGNOSIS — I10 ESSENTIAL HYPERTENSION: ICD-10-CM

## 2020-03-11 LAB
ALBUMIN SERPL-MCNC: 3.9 G/DL (ref 3.5–5.2)
ALBUMIN/GLOB SERPL: 1.5 G/DL
ALP SERPL-CCNC: 120 U/L (ref 39–117)
ALT SERPL-CCNC: 11 U/L (ref 1–41)
AST SERPL-CCNC: 13 U/L (ref 1–40)
BILIRUB SERPL-MCNC: 0.3 MG/DL (ref 0.2–1.2)
BUN SERPL-MCNC: 23 MG/DL (ref 8–23)
BUN/CREAT SERPL: 22.3 (ref 7–25)
CALCIUM SERPL-MCNC: 9 MG/DL (ref 8.6–10.5)
CHLORIDE SERPL-SCNC: 102 MMOL/L (ref 98–107)
CHOLEST SERPL-MCNC: 138 MG/DL (ref 0–200)
CO2 SERPL-SCNC: 23.6 MMOL/L (ref 22–29)
CREAT SERPL-MCNC: 1.03 MG/DL (ref 0.76–1.27)
GLOBULIN SER CALC-MCNC: 2.6 GM/DL
GLUCOSE SERPL-MCNC: 92 MG/DL (ref 65–99)
HDLC SERPL-MCNC: 34 MG/DL (ref 40–60)
LDLC SERPL CALC-MCNC: 83 MG/DL (ref 0–100)
POTASSIUM SERPL-SCNC: 4.5 MMOL/L (ref 3.5–5.2)
PROT SERPL-MCNC: 6.5 G/DL (ref 6–8.5)
SODIUM SERPL-SCNC: 139 MMOL/L (ref 136–145)
TRIGL SERPL-MCNC: 104 MG/DL (ref 0–150)
VLDLC SERPL CALC-MCNC: 20.8 MG/DL

## 2020-04-03 ENCOUNTER — APPOINTMENT (OUTPATIENT)
Dept: GENERAL RADIOLOGY | Facility: HOSPITAL | Age: 65
End: 2020-04-03

## 2020-04-03 ENCOUNTER — HOSPITAL ENCOUNTER (INPATIENT)
Facility: HOSPITAL | Age: 65
LOS: 1 days | Discharge: HOME | End: 2020-04-04
Attending: EMERGENCY MEDICINE | Admitting: INTERNAL MEDICINE

## 2020-04-03 ENCOUNTER — APPOINTMENT (OUTPATIENT)
Dept: CARDIOLOGY | Facility: HOSPITAL | Age: 65
End: 2020-04-03

## 2020-04-03 DIAGNOSIS — I21.19 ACUTE ST ELEVATION MYOCARDIAL INFARCTION (STEMI) INVOLVING OTHER CORONARY ARTERY OF INFERIOR WALL (HCC): Primary | ICD-10-CM

## 2020-04-03 DIAGNOSIS — I10 ESSENTIAL HYPERTENSION: ICD-10-CM

## 2020-04-03 PROBLEM — I21.9 ACUTE MYOCARDIAL INFARCTION (HCC): Status: ACTIVE | Noted: 2020-04-03

## 2020-04-03 LAB
ALBUMIN SERPL-MCNC: 3.8 G/DL (ref 3.5–5.2)
ALBUMIN/GLOB SERPL: 1.4 G/DL
ALP SERPL-CCNC: 111 U/L (ref 39–117)
ALT SERPL W P-5'-P-CCNC: 11 U/L (ref 1–41)
ANION GAP SERPL CALCULATED.3IONS-SCNC: 10.3 MMOL/L (ref 5–15)
ANION GAP SERPL CALCULATED.3IONS-SCNC: 15.2 MMOL/L (ref 5–15)
AORTIC DIMENSIONLESS INDEX: 0.8 (DI)
AST SERPL-CCNC: 11 U/L (ref 1–40)
BASOPHILS # BLD AUTO: 0.03 10*3/MM3 (ref 0–0.2)
BASOPHILS NFR BLD AUTO: 0.3 % (ref 0–1.5)
BH CV ECHO MEAS - AO MAX PG (FULL): 1.9 MMHG
BH CV ECHO MEAS - AO MAX PG: 5.1 MMHG
BH CV ECHO MEAS - AO MEAN PG (FULL): 2 MMHG
BH CV ECHO MEAS - AO MEAN PG: 3 MMHG
BH CV ECHO MEAS - AO ROOT AREA (BSA CORRECTED): 1.7
BH CV ECHO MEAS - AO ROOT AREA: 7.5 CM^2
BH CV ECHO MEAS - AO ROOT DIAM: 3.1 CM
BH CV ECHO MEAS - AO V2 MAX: 113 CM/SEC
BH CV ECHO MEAS - AO V2 MEAN: 75 CM/SEC
BH CV ECHO MEAS - AO V2 VTI: 22.2 CM
BH CV ECHO MEAS - AVA(I,A): 2.2 CM^2
BH CV ECHO MEAS - AVA(I,D): 2.2 CM^2
BH CV ECHO MEAS - AVA(V,A): 2.2 CM^2
BH CV ECHO MEAS - AVA(V,D): 2.2 CM^2
BH CV ECHO MEAS - BSA(HAYCOCK): 1.8 M^2
BH CV ECHO MEAS - BSA: 1.8 M^2
BH CV ECHO MEAS - BZI_BMI: 18.7 KILOGRAMS/M^2
BH CV ECHO MEAS - BZI_METRIC_HEIGHT: 182 CM
BH CV ECHO MEAS - BZI_METRIC_WEIGHT: 62 KG
BH CV ECHO MEAS - EDV(CUBED): 110.6 ML
BH CV ECHO MEAS - EDV(MOD-SP2): 111 ML
BH CV ECHO MEAS - EDV(MOD-SP4): 114 ML
BH CV ECHO MEAS - EDV(TEICH): 107.5 ML
BH CV ECHO MEAS - EF(CUBED): 46.4 %
BH CV ECHO MEAS - EF(MOD-BP): 45 %
BH CV ECHO MEAS - EF(MOD-SP2): 57.7 %
BH CV ECHO MEAS - EF(MOD-SP4): 40.4 %
BH CV ECHO MEAS - EF(TEICH): 38.7 %
BH CV ECHO MEAS - ESV(CUBED): 59.3 ML
BH CV ECHO MEAS - ESV(MOD-SP2): 47 ML
BH CV ECHO MEAS - ESV(MOD-SP4): 68 ML
BH CV ECHO MEAS - ESV(TEICH): 65.9 ML
BH CV ECHO MEAS - FS: 18.8 %
BH CV ECHO MEAS - IVS/LVPW: 1.3
BH CV ECHO MEAS - IVSD: 0.8 CM
BH CV ECHO MEAS - LAT PEAK E' VEL: 7.4 CM/SEC
BH CV ECHO MEAS - LV DIASTOLIC VOL/BSA (35-75): 63.1 ML/M^2
BH CV ECHO MEAS - LV MASS(C)D: 106.9 GRAMS
BH CV ECHO MEAS - LV MASS(C)DI: 59.2 GRAMS/M^2
BH CV ECHO MEAS - LV MAX PG: 3.2 MMHG
BH CV ECHO MEAS - LV MEAN PG: 1 MMHG
BH CV ECHO MEAS - LV SYSTOLIC VOL/BSA (12-30): 37.7 ML/M^2
BH CV ECHO MEAS - LV V1 MAX: 89.4 CM/SEC
BH CV ECHO MEAS - LV V1 MEAN: 51 CM/SEC
BH CV ECHO MEAS - LV V1 VTI: 17.6 CM
BH CV ECHO MEAS - LVIDD: 4.8 CM
BH CV ECHO MEAS - LVIDS: 3.9 CM
BH CV ECHO MEAS - LVLD AP2: 8.6 CM
BH CV ECHO MEAS - LVLD AP4: 8.3 CM
BH CV ECHO MEAS - LVLS AP2: 6.5 CM
BH CV ECHO MEAS - LVLS AP4: 7.9 CM
BH CV ECHO MEAS - LVOT AREA (M): 2.7 CM^2
BH CV ECHO MEAS - LVOT AREA: 2.8 CM^2
BH CV ECHO MEAS - LVOT DIAM: 1.9 CM
BH CV ECHO MEAS - LVPWD: 0.6 CM
BH CV ECHO MEAS - MED PEAK E' VEL: 5.2 CM/SEC
BH CV ECHO MEAS - MV A DUR: 140 SEC
BH CV ECHO MEAS - MV A MAX VEL: 69.4 CM/SEC
BH CV ECHO MEAS - MV DEC SLOPE: 211 CM/SEC^2
BH CV ECHO MEAS - MV DEC TIME: 209 SEC
BH CV ECHO MEAS - MV E MAX VEL: 45.3 CM/SEC
BH CV ECHO MEAS - MV E/A: 0.65
BH CV ECHO MEAS - MV MAX PG: 3.1 MMHG
BH CV ECHO MEAS - MV MEAN PG: 1 MMHG
BH CV ECHO MEAS - MV P1/2T MAX VEL: 61.4 CM/SEC
BH CV ECHO MEAS - MV P1/2T: 85.2 MSEC
BH CV ECHO MEAS - MV V2 MAX: 88.4 CM/SEC
BH CV ECHO MEAS - MV V2 MEAN: 44.2 CM/SEC
BH CV ECHO MEAS - MV V2 VTI: 20.2 CM
BH CV ECHO MEAS - MVA P1/2T LCG: 3.6 CM^2
BH CV ECHO MEAS - MVA(P1/2T): 2.6 CM^2
BH CV ECHO MEAS - MVA(VTI): 2.5 CM^2
BH CV ECHO MEAS - PA ACC TIME: 0.07 SEC
BH CV ECHO MEAS - PA MAX PG (FULL): 0.89 MMHG
BH CV ECHO MEAS - PA MAX PG: 2.7 MMHG
BH CV ECHO MEAS - PA PR(ACCEL): 47.1 MMHG
BH CV ECHO MEAS - PA V2 MAX: 82.1 CM/SEC
BH CV ECHO MEAS - PVA(V,A): 2.1 CM^2
BH CV ECHO MEAS - PVA(V,D): 2.1 CM^2
BH CV ECHO MEAS - QP/QS: 0.51
BH CV ECHO MEAS - RV MAX PG: 1.8 MMHG
BH CV ECHO MEAS - RV MEAN PG: 1 MMHG
BH CV ECHO MEAS - RV V1 MAX: 67.2 CM/SEC
BH CV ECHO MEAS - RV V1 MEAN: 43.9 CM/SEC
BH CV ECHO MEAS - RV V1 VTI: 10 CM
BH CV ECHO MEAS - RVOT AREA: 2.5 CM^2
BH CV ECHO MEAS - RVOT DIAM: 1.8 CM
BH CV ECHO MEAS - SI(AO): 92.8 ML/M^2
BH CV ECHO MEAS - SI(CUBED): 28.4 ML/M^2
BH CV ECHO MEAS - SI(LVOT): 27.6 ML/M^2
BH CV ECHO MEAS - SI(MOD-SP2): 35.4 ML/M^2
BH CV ECHO MEAS - SI(MOD-SP4): 25.5 ML/M^2
BH CV ECHO MEAS - SI(TEICH): 23 ML/M^2
BH CV ECHO MEAS - SV(AO): 167.6 ML
BH CV ECHO MEAS - SV(CUBED): 51.3 ML
BH CV ECHO MEAS - SV(LVOT): 49.9 ML
BH CV ECHO MEAS - SV(MOD-SP2): 64 ML
BH CV ECHO MEAS - SV(MOD-SP4): 46 ML
BH CV ECHO MEAS - SV(RVOT): 25.4 ML
BH CV ECHO MEAS - SV(TEICH): 41.6 ML
BH CV ECHO MEAS - TAPSE (>1.6): 2.2 CM2
BH CV ECHO MEASUREMENTS AVERAGE E/E' RATIO: 7.19
BH CV XLRA - RV BASE: 3.7 CM
BH CV XLRA - RV LENGTH: 8.21 CM
BH CV XLRA - RV MID: 2.95 CM
BH CV XLRA - TDI S': 12.9 CM/SEC
BILIRUB SERPL-MCNC: 0.2 MG/DL (ref 0.2–1.2)
BUN BLD-MCNC: 16 MG/DL (ref 8–23)
BUN BLD-MCNC: 17 MG/DL (ref 8–23)
BUN/CREAT SERPL: 17.2 (ref 7–25)
BUN/CREAT SERPL: 19.3 (ref 7–25)
CALCIUM SPEC-SCNC: 7.3 MG/DL (ref 8.6–10.5)
CALCIUM SPEC-SCNC: 8.4 MG/DL (ref 8.6–10.5)
CHLORIDE SERPL-SCNC: 104 MMOL/L (ref 98–107)
CHLORIDE SERPL-SCNC: 105 MMOL/L (ref 98–107)
CHOLEST SERPL-MCNC: 107 MG/DL (ref 0–200)
CO2 SERPL-SCNC: 17.7 MMOL/L (ref 22–29)
CO2 SERPL-SCNC: 20.8 MMOL/L (ref 22–29)
CREAT BLD-MCNC: 0.83 MG/DL (ref 0.76–1.27)
CREAT BLD-MCNC: 0.99 MG/DL (ref 0.76–1.27)
DEPRECATED RDW RBC AUTO: 44 FL (ref 37–54)
DEPRECATED RDW RBC AUTO: 44.1 FL (ref 37–54)
EOSINOPHIL # BLD AUTO: 0.14 10*3/MM3 (ref 0–0.4)
EOSINOPHIL NFR BLD AUTO: 1.4 % (ref 0.3–6.2)
ERYTHROCYTE [DISTWIDTH] IN BLOOD BY AUTOMATED COUNT: 13.7 % (ref 12.3–15.4)
ERYTHROCYTE [DISTWIDTH] IN BLOOD BY AUTOMATED COUNT: 13.7 % (ref 12.3–15.4)
GFR SERPL CREATININE-BSD FRML MDRD: 76 ML/MIN/1.73
GFR SERPL CREATININE-BSD FRML MDRD: 93 ML/MIN/1.73
GLOBULIN UR ELPH-MCNC: 2.8 GM/DL
GLUCOSE BLD-MCNC: 117 MG/DL (ref 65–99)
GLUCOSE BLD-MCNC: 138 MG/DL (ref 65–99)
GLUCOSE BLDC GLUCOMTR-MCNC: 102 MG/DL (ref 70–130)
GLUCOSE BLDC GLUCOMTR-MCNC: 103 MG/DL (ref 70–130)
GLUCOSE BLDC GLUCOMTR-MCNC: 122 MG/DL (ref 70–130)
HBA1C MFR BLD: 5.6 % (ref 4.8–5.6)
HCT VFR BLD AUTO: 36.3 % (ref 37.5–51)
HCT VFR BLD AUTO: 45.1 % (ref 37.5–51)
HDLC SERPL-MCNC: 30 MG/DL (ref 40–60)
HGB BLD-MCNC: 11.9 G/DL (ref 13–17.7)
HGB BLD-MCNC: 14.9 G/DL (ref 13–17.7)
HOLD SPECIMEN: NORMAL
HOLD SPECIMEN: NORMAL
IMM GRANULOCYTES # BLD AUTO: 0.04 10*3/MM3 (ref 0–0.05)
IMM GRANULOCYTES NFR BLD AUTO: 0.4 % (ref 0–0.5)
INR PPP: 1.1 (ref 0.9–1.1)
LDLC SERPL CALC-MCNC: 68 MG/DL (ref 0–100)
LDLC/HDLC SERPL: 2.28 {RATIO}
LEFT ATRIUM VOLUME INDEX: 12.5 ML/M2
LV EF 2D ECHO EST: 40 %
LYMPHOCYTES # BLD AUTO: 2.09 10*3/MM3 (ref 0.7–3.1)
LYMPHOCYTES NFR BLD AUTO: 20.9 % (ref 19.6–45.3)
MAGNESIUM SERPL-MCNC: 1.9 MG/DL (ref 1.6–2.4)
MAXIMAL PREDICTED HEART RATE: 154 BPM
MCH RBC QN AUTO: 28.7 PG (ref 26.6–33)
MCH RBC QN AUTO: 28.7 PG (ref 26.6–33)
MCHC RBC AUTO-ENTMCNC: 32.8 G/DL (ref 31.5–35.7)
MCHC RBC AUTO-ENTMCNC: 33 G/DL (ref 31.5–35.7)
MCV RBC AUTO: 86.9 FL (ref 79–97)
MCV RBC AUTO: 87.5 FL (ref 79–97)
MONOCYTES # BLD AUTO: 0.68 10*3/MM3 (ref 0.1–0.9)
MONOCYTES NFR BLD AUTO: 6.8 % (ref 5–12)
NEUTROPHILS # BLD AUTO: 7.04 10*3/MM3 (ref 1.7–7)
NEUTROPHILS NFR BLD AUTO: 70.2 % (ref 42.7–76)
NRBC BLD AUTO-RTO: 0 /100 WBC (ref 0–0.2)
PLATELET # BLD AUTO: 143 10*3/MM3 (ref 140–450)
PLATELET # BLD AUTO: 188 10*3/MM3 (ref 140–450)
PMV BLD AUTO: 10.7 FL (ref 6–12)
PMV BLD AUTO: 11 FL (ref 6–12)
POTASSIUM BLD-SCNC: 3.7 MMOL/L (ref 3.5–5.2)
POTASSIUM BLD-SCNC: 3.8 MMOL/L (ref 3.5–5.2)
PROT SERPL-MCNC: 6.6 G/DL (ref 6–8.5)
PROTHROMBIN TIME: 13.9 SECONDS (ref 11.7–14.2)
RBC # BLD AUTO: 4.15 10*6/MM3 (ref 4.14–5.8)
RBC # BLD AUTO: 5.19 10*6/MM3 (ref 4.14–5.8)
SODIUM BLD-SCNC: 133 MMOL/L (ref 136–145)
SODIUM BLD-SCNC: 140 MMOL/L (ref 136–145)
STRESS TARGET HR: 131 BPM
TRIGL SERPL-MCNC: 43 MG/DL (ref 0–150)
TROPONIN T SERPL-MCNC: 0.41 NG/ML (ref 0–0.03)
TROPONIN T SERPL-MCNC: <0.01 NG/ML (ref 0–0.03)
VLDLC SERPL-MCNC: 8.6 MG/DL (ref 5–40)
WBC NRBC COR # BLD: 10.02 10*3/MM3 (ref 3.4–10.8)
WBC NRBC COR # BLD: 11.06 10*3/MM3 (ref 3.4–10.8)
WHOLE BLOOD HOLD SPECIMEN: NORMAL
WHOLE BLOOD HOLD SPECIMEN: NORMAL

## 2020-04-03 PROCEDURE — 25010000002 PERFLUTREN (DEFINITY) 8.476 MG IN SODIUM CHLORIDE 0.9 % 10 ML INJECTION: Performed by: INTERNAL MEDICINE

## 2020-04-03 PROCEDURE — C1874 STENT, COATED/COV W/DEL SYS: HCPCS | Performed by: INTERNAL MEDICINE

## 2020-04-03 PROCEDURE — 85025 COMPLETE CBC W/AUTO DIFF WBC: CPT | Performed by: EMERGENCY MEDICINE

## 2020-04-03 PROCEDURE — 83036 HEMOGLOBIN GLYCOSYLATED A1C: CPT | Performed by: INTERNAL MEDICINE

## 2020-04-03 PROCEDURE — 93306 TTE W/DOPPLER COMPLETE: CPT | Performed by: INTERNAL MEDICINE

## 2020-04-03 PROCEDURE — 25010000002 BH (CUPID ONLY) ADENOSINE 6 MG/100ML MIXTURE: Performed by: INTERNAL MEDICINE

## 2020-04-03 PROCEDURE — B2151ZZ FLUOROSCOPY OF LEFT HEART USING LOW OSMOLAR CONTRAST: ICD-10-PCS | Performed by: INTERNAL MEDICINE

## 2020-04-03 PROCEDURE — 92941 PRQ TRLML REVSC TOT OCCL AMI: CPT | Performed by: INTERNAL MEDICINE

## 2020-04-03 PROCEDURE — C1725 CATH, TRANSLUMIN NON-LASER: HCPCS | Performed by: INTERNAL MEDICINE

## 2020-04-03 PROCEDURE — 80053 COMPREHEN METABOLIC PANEL: CPT | Performed by: EMERGENCY MEDICINE

## 2020-04-03 PROCEDURE — C1894 INTRO/SHEATH, NON-LASER: HCPCS | Performed by: INTERNAL MEDICINE

## 2020-04-03 PROCEDURE — 71045 X-RAY EXAM CHEST 1 VIEW: CPT

## 2020-04-03 PROCEDURE — 93458 L HRT ARTERY/VENTRICLE ANGIO: CPT | Performed by: INTERNAL MEDICINE

## 2020-04-03 PROCEDURE — 99285 EMERGENCY DEPT VISIT HI MDM: CPT

## 2020-04-03 PROCEDURE — 25010000002 PHENYLEPHRINE PER 1 ML: Performed by: INTERNAL MEDICINE

## 2020-04-03 PROCEDURE — 027035Z DILATION OF CORONARY ARTERY, ONE ARTERY WITH TWO DRUG-ELUTING INTRALUMINAL DEVICES, PERCUTANEOUS APPROACH: ICD-10-PCS | Performed by: INTERNAL MEDICINE

## 2020-04-03 PROCEDURE — 84484 ASSAY OF TROPONIN QUANT: CPT | Performed by: INTERNAL MEDICINE

## 2020-04-03 PROCEDURE — 93306 TTE W/DOPPLER COMPLETE: CPT

## 2020-04-03 PROCEDURE — 93005 ELECTROCARDIOGRAM TRACING: CPT | Performed by: EMERGENCY MEDICINE

## 2020-04-03 PROCEDURE — C1887 CATHETER, GUIDING: HCPCS | Performed by: INTERNAL MEDICINE

## 2020-04-03 PROCEDURE — 93010 ELECTROCARDIOGRAM REPORT: CPT | Performed by: INTERNAL MEDICINE

## 2020-04-03 PROCEDURE — 85610 PROTHROMBIN TIME: CPT | Performed by: EMERGENCY MEDICINE

## 2020-04-03 PROCEDURE — 93005 ELECTROCARDIOGRAM TRACING: CPT | Performed by: INTERNAL MEDICINE

## 2020-04-03 PROCEDURE — 25010000002 MIDAZOLAM PER 1 MG: Performed by: INTERNAL MEDICINE

## 2020-04-03 PROCEDURE — 25010000002 HEPARIN (PORCINE) PER 1000 UNITS: Performed by: INTERNAL MEDICINE

## 2020-04-03 PROCEDURE — C1769 GUIDE WIRE: HCPCS | Performed by: INTERNAL MEDICINE

## 2020-04-03 PROCEDURE — B2111ZZ FLUOROSCOPY OF MULTIPLE CORONARY ARTERIES USING LOW OSMOLAR CONTRAST: ICD-10-PCS | Performed by: INTERNAL MEDICINE

## 2020-04-03 PROCEDURE — 4A023N7 MEASUREMENT OF CARDIAC SAMPLING AND PRESSURE, LEFT HEART, PERCUTANEOUS APPROACH: ICD-10-PCS | Performed by: INTERNAL MEDICINE

## 2020-04-03 PROCEDURE — 82962 GLUCOSE BLOOD TEST: CPT

## 2020-04-03 PROCEDURE — 0 IOPAMIDOL PER 1 ML: Performed by: INTERNAL MEDICINE

## 2020-04-03 PROCEDURE — 80061 LIPID PANEL: CPT | Performed by: INTERNAL MEDICINE

## 2020-04-03 PROCEDURE — 25010000002 HEPARIN (PORCINE) PER 1000 UNITS: Performed by: EMERGENCY MEDICINE

## 2020-04-03 PROCEDURE — 25010000002 FENTANYL CITRATE (PF) 100 MCG/2ML SOLUTION: Performed by: INTERNAL MEDICINE

## 2020-04-03 PROCEDURE — 84484 ASSAY OF TROPONIN QUANT: CPT | Performed by: EMERGENCY MEDICINE

## 2020-04-03 PROCEDURE — 99153 MOD SED SAME PHYS/QHP EA: CPT | Performed by: INTERNAL MEDICINE

## 2020-04-03 PROCEDURE — 83735 ASSAY OF MAGNESIUM: CPT | Performed by: EMERGENCY MEDICINE

## 2020-04-03 PROCEDURE — 99152 MOD SED SAME PHYS/QHP 5/>YRS: CPT | Performed by: INTERNAL MEDICINE

## 2020-04-03 PROCEDURE — C9606 PERC D-E COR REVASC W AMI S: HCPCS | Performed by: INTERNAL MEDICINE

## 2020-04-03 PROCEDURE — 85347 COAGULATION TIME ACTIVATED: CPT

## 2020-04-03 PROCEDURE — 99223 1ST HOSP IP/OBS HIGH 75: CPT | Performed by: INTERNAL MEDICINE

## 2020-04-03 PROCEDURE — 85027 COMPLETE CBC AUTOMATED: CPT | Performed by: INTERNAL MEDICINE

## 2020-04-03 DEVICE — XIENCE SIERRA™ EVEROLIMUS ELUTING CORONARY STENT SYSTEM 4.00 MM X 18 MM / RAPID-EXCHANGE
Type: IMPLANTABLE DEVICE | Status: FUNCTIONAL
Brand: XIENCE SIERRA™

## 2020-04-03 RX ORDER — MIDAZOLAM HYDROCHLORIDE 1 MG/ML
INJECTION INTRAMUSCULAR; INTRAVENOUS AS NEEDED
Status: DISCONTINUED | OUTPATIENT
Start: 2020-04-03 | End: 2020-04-03 | Stop reason: HOSPADM

## 2020-04-03 RX ORDER — CLOPIDOGREL BISULFATE 75 MG/1
75 TABLET ORAL DAILY
Status: DISCONTINUED | OUTPATIENT
Start: 2020-04-04 | End: 2020-04-04 | Stop reason: HOSPADM

## 2020-04-03 RX ORDER — TEMAZEPAM 15 MG/1
15 CAPSULE ORAL NIGHTLY PRN
Status: DISCONTINUED | OUTPATIENT
Start: 2020-04-03 | End: 2020-04-04 | Stop reason: HOSPADM

## 2020-04-03 RX ORDER — SODIUM CHLORIDE 0.9 % (FLUSH) 0.9 %
10 SYRINGE (ML) INJECTION AS NEEDED
Status: DISCONTINUED | OUTPATIENT
Start: 2020-04-03 | End: 2020-04-04 | Stop reason: HOSPADM

## 2020-04-03 RX ORDER — HYDROCODONE BITARTRATE AND ACETAMINOPHEN 5; 325 MG/1; MG/1
1 TABLET ORAL EVERY 4 HOURS PRN
Status: DISCONTINUED | OUTPATIENT
Start: 2020-04-03 | End: 2020-04-04 | Stop reason: HOSPADM

## 2020-04-03 RX ORDER — MORPHINE SULFATE 2 MG/ML
2 INJECTION, SOLUTION INTRAMUSCULAR; INTRAVENOUS
Status: DISCONTINUED | OUTPATIENT
Start: 2020-04-03 | End: 2020-04-04 | Stop reason: HOSPADM

## 2020-04-03 RX ORDER — ONDANSETRON 2 MG/ML
4 INJECTION INTRAMUSCULAR; INTRAVENOUS EVERY 6 HOURS PRN
Status: DISCONTINUED | OUTPATIENT
Start: 2020-04-03 | End: 2020-04-04 | Stop reason: HOSPADM

## 2020-04-03 RX ORDER — CLOPIDOGREL BISULFATE 75 MG/1
600 TABLET ORAL ONCE
Status: DISCONTINUED | OUTPATIENT
Start: 2020-04-03 | End: 2020-04-03

## 2020-04-03 RX ORDER — NALOXONE HCL 0.4 MG/ML
0.4 VIAL (ML) INJECTION
Status: DISCONTINUED | OUTPATIENT
Start: 2020-04-03 | End: 2020-04-04 | Stop reason: HOSPADM

## 2020-04-03 RX ORDER — AMOXICILLIN 250 MG
2 CAPSULE ORAL DAILY
Status: DISCONTINUED | OUTPATIENT
Start: 2020-04-03 | End: 2020-04-04 | Stop reason: HOSPADM

## 2020-04-03 RX ORDER — ONDANSETRON 4 MG/1
4 TABLET, FILM COATED ORAL EVERY 6 HOURS PRN
Status: DISCONTINUED | OUTPATIENT
Start: 2020-04-03 | End: 2020-04-04 | Stop reason: HOSPADM

## 2020-04-03 RX ORDER — ASPIRIN 81 MG/1
81 TABLET, CHEWABLE ORAL DAILY
Status: DISCONTINUED | OUTPATIENT
Start: 2020-04-03 | End: 2020-04-04 | Stop reason: HOSPADM

## 2020-04-03 RX ORDER — CLOPIDOGREL BISULFATE 75 MG/1
TABLET ORAL
Status: COMPLETED | OUTPATIENT
Start: 2020-04-03 | End: 2020-04-03

## 2020-04-03 RX ORDER — ACETAMINOPHEN 325 MG/1
650 TABLET ORAL EVERY 4 HOURS PRN
Status: DISCONTINUED | OUTPATIENT
Start: 2020-04-03 | End: 2020-04-04 | Stop reason: HOSPADM

## 2020-04-03 RX ORDER — HEPARIN SODIUM 1000 [USP'U]/ML
INJECTION, SOLUTION INTRAVENOUS; SUBCUTANEOUS AS NEEDED
Status: DISCONTINUED | OUTPATIENT
Start: 2020-04-03 | End: 2020-04-03 | Stop reason: HOSPADM

## 2020-04-03 RX ORDER — HEPARIN SODIUM 5000 [USP'U]/ML
INJECTION, SOLUTION INTRAVENOUS; SUBCUTANEOUS
Status: COMPLETED | OUTPATIENT
Start: 2020-04-03 | End: 2020-04-03

## 2020-04-03 RX ORDER — SODIUM CHLORIDE 9 MG/ML
INJECTION, SOLUTION INTRAVENOUS CONTINUOUS PRN
Status: COMPLETED | OUTPATIENT
Start: 2020-04-03 | End: 2020-04-03

## 2020-04-03 RX ORDER — SODIUM CHLORIDE 9 MG/ML
50 INJECTION, SOLUTION INTRAVENOUS CONTINUOUS
Status: ACTIVE | OUTPATIENT
Start: 2020-04-03 | End: 2020-04-03

## 2020-04-03 RX ORDER — HEPARIN SODIUM 5000 [USP'U]/ML
60 INJECTION, SOLUTION INTRAVENOUS; SUBCUTANEOUS ONCE
Status: DISCONTINUED | OUTPATIENT
Start: 2020-04-03 | End: 2020-04-03

## 2020-04-03 RX ORDER — FENTANYL CITRATE 50 UG/ML
INJECTION, SOLUTION INTRAMUSCULAR; INTRAVENOUS AS NEEDED
Status: DISCONTINUED | OUTPATIENT
Start: 2020-04-03 | End: 2020-04-03 | Stop reason: HOSPADM

## 2020-04-03 RX ORDER — ATORVASTATIN CALCIUM 20 MG/1
40 TABLET, FILM COATED ORAL DAILY
Status: DISCONTINUED | OUTPATIENT
Start: 2020-04-03 | End: 2020-04-04 | Stop reason: HOSPADM

## 2020-04-03 RX ORDER — LIDOCAINE HYDROCHLORIDE 20 MG/ML
INJECTION, SOLUTION INFILTRATION; PERINEURAL AS NEEDED
Status: DISCONTINUED | OUTPATIENT
Start: 2020-04-03 | End: 2020-04-03 | Stop reason: HOSPADM

## 2020-04-03 RX ORDER — VERAPAMIL HYDROCHLORIDE 2.5 MG/ML
INJECTION, SOLUTION INTRAVENOUS AS NEEDED
Status: DISCONTINUED | OUTPATIENT
Start: 2020-04-03 | End: 2020-04-03 | Stop reason: HOSPADM

## 2020-04-03 RX ADMIN — DOCUSATE SODIUM 50MG AND SENNOSIDES 8.6MG 2 TABLET: 8.6; 5 TABLET, FILM COATED ORAL at 09:08

## 2020-04-03 RX ADMIN — SODIUM CHLORIDE 50 ML/HR: 9 INJECTION, SOLUTION INTRAVENOUS at 06:10

## 2020-04-03 RX ADMIN — CLOPIDOGREL BISULFATE 600 MG: 75 TABLET ORAL at 03:56

## 2020-04-03 RX ADMIN — ATORVASTATIN CALCIUM 40 MG: 20 TABLET, FILM COATED ORAL at 09:09

## 2020-04-03 RX ADMIN — ASPIRIN 81 MG: 81 TABLET, CHEWABLE ORAL at 09:09

## 2020-04-03 RX ADMIN — METOPROLOL TARTRATE 25 MG: 25 TABLET ORAL at 20:31

## 2020-04-03 RX ADMIN — METOPROLOL TARTRATE 25 MG: 25 TABLET ORAL at 09:09

## 2020-04-03 RX ADMIN — PERFLUTREN 2 ML: 6.52 INJECTION, SUSPENSION INTRAVENOUS at 07:50

## 2020-04-03 RX ADMIN — HEPARIN SODIUM 4000 UNITS: 5000 INJECTION, SOLUTION INTRAVENOUS; SUBCUTANEOUS at 03:58

## 2020-04-03 NOTE — ED PROVIDER NOTES
EMERGENCY DEPARTMENT ENCOUNTER    CHIEF COMPLAINT  Chief Complaint: cp  History given by: patient  History limited by: none  Room Number: 16/16  PMD: Giancarlo Guadalupe MD      HPI:  Pt is a 64 y.o. male who presents complaining of cp that began 1.5 hours PTA when he woke. Cp is worse with exertion/ambulating. Pt did take 324mg ASA PTA and have NTG by EMS en route. No fever, cough, or sore throat per pt. No other associated sx. Pt denies coronary hx but states that his father and brother have hx of MI and he has hx of HTN. Pt denies known COVID-19 exposure. He states him and his wife have remained inside their house for the past 17 days.     Duration:  1.5 hours PTA  Onset: gradual  Timing: constant  Location: chest  Quality: pain  Intensity/Severity: moderate  Progression: unchanged  Aggravating Factors: exertion  Alleviating Factors: none  Previous Episodes: none  Treatment before arrival: EMS care and treatment    PAST MEDICAL HISTORY  Active Ambulatory Problems     Diagnosis Date Noted   • BP (high blood pressure) 11/05/2019   • Cigarette smoker 03/05/2020   • Hyperlipidemia 03/05/2020   • High risk medication use 03/05/2020     Resolved Ambulatory Problems     Diagnosis Date Noted   • No Resolved Ambulatory Problems     Past Medical History:   Diagnosis Date   • Hypertension        PAST SURGICAL HISTORY  Past Surgical History:   Procedure Laterality Date   • HERNIA REPAIR         FAMILY HISTORY  Family History   Problem Relation Age of Onset   • Emphysema Mother    • COPD Mother    • Heart attack Father    • COPD Brother    • Emphysema Brother        SOCIAL HISTORY  Social History     Socioeconomic History   • Marital status:      Spouse name: Not on file   • Number of children: Not on file   • Years of education: Not on file   • Highest education level: Not on file   Tobacco Use   • Smoking status: Current Every Day Smoker     Packs/day: 1.00     Types: Cigarettes   • Smokeless tobacco: Never Used    Substance and Sexual Activity   • Alcohol use: No     Frequency: Never   • Drug use: No   • Sexual activity: Yes     Partners: Female       ALLERGIES  Patient has no known allergies.    REVIEW OF SYSTEMS  Review of Systems   Constitutional: Negative for activity change, appetite change and fever.   HENT: Negative for congestion and sore throat.    Eyes: Negative.    Respiratory: Negative for cough and shortness of breath.    Cardiovascular: Positive for chest pain. Negative for leg swelling.   Gastrointestinal: Negative for abdominal pain, diarrhea and vomiting.   Endocrine: Negative.    Genitourinary: Negative for decreased urine volume and dysuria.   Musculoskeletal: Negative for neck pain.   Skin: Negative for rash and wound.   Allergic/Immunologic: Negative.    Neurological: Negative for weakness, numbness and headaches.   Hematological: Negative.    Psychiatric/Behavioral: Negative.    All other systems reviewed and are negative.      PHYSICAL EXAM  ED Triage Vitals   Temp Pulse Resp BP SpO2   -- -- -- -- --      Temp src Heart Rate Source Patient Position BP Location FiO2 (%)   -- -- -- -- --       Physical Exam   Constitutional: He is oriented to person, place, and time. No distress.   HENT:   Head: Normocephalic and atraumatic.   Eyes: Pupils are equal, round, and reactive to light. EOM are normal.   Neck: Normal range of motion. Neck supple.   Cardiovascular: Normal rate, regular rhythm and normal heart sounds.   Pulmonary/Chest: Effort normal and breath sounds normal. No respiratory distress.   Abdominal: Soft. There is no tenderness. There is no rebound and no guarding.   Musculoskeletal: Normal range of motion. He exhibits no edema.   Neurological: He is alert and oriented to person, place, and time. He has normal sensation and normal strength.   Skin: Skin is warm and dry.   Psychiatric: Mood and affect normal.   Nursing note and vitals reviewed.      LAB RESULTS  Lab Results (last 24 hours)      Procedure Component Value Units Date/Time    CBC & Differential [[id number]] Collected:  04/03/20 0401    Specimen:  Blood Updated:  04/03/20 0415    Narrative:       The following orders were created for panel order CBC & Differential.  Procedure                               Abnormality         Status                     ---------                               -----------         ------                     CBC Auto Differential[[id number]]        Abnormal            Final result                 Please view results for these tests on the individual orders.    Troponin [[id number]] Collected:  04/03/20 0401    Specimen:  Blood Updated:  04/03/20 0408    Comprehensive Metabolic Panel [[id number]] Collected:  04/03/20 0401    Specimen:  Blood Updated:  04/03/20 0408    Magnesium [[id number]] Collected:  04/03/20 0401    Specimen:  Blood Updated:  04/03/20 0408    Protime-INR [[id number]] Collected:  04/03/20 0401    Specimen:  Blood Updated:  04/03/20 0409    CBC Auto Differential [[id number]]  (Abnormal) Collected:  04/03/20 0401    Specimen:  Blood Updated:  04/03/20 0415     WBC 10.02 10*3/mm3      RBC 5.19 10*6/mm3      Hemoglobin 14.9 g/dL      Hematocrit 45.1 %      MCV 86.9 fL      MCH 28.7 pg      MCHC 33.0 g/dL      RDW 13.7 %      RDW-SD 44.0 fl      MPV 10.7 fL      Platelets 188 10*3/mm3      Neutrophil % 70.2 %      Lymphocyte % 20.9 %      Monocyte % 6.8 %      Eosinophil % 1.4 %      Basophil % 0.3 %      Immature Grans % 0.4 %      Neutrophils, Absolute 7.04 10*3/mm3      Lymphocytes, Absolute 2.09 10*3/mm3      Monocytes, Absolute 0.68 10*3/mm3      Eosinophils, Absolute 0.14 10*3/mm3      Basophils, Absolute 0.03 10*3/mm3      Immature Grans, Absolute 0.04 10*3/mm3      nRBC 0.0 /100 WBC           I ordered the above labs and reviewed the results    RADIOLOGY  XR Chest 1 View    (Results Pending)        I ordered the above noted radiological studies. Interpreted by radiologist. Reviewed by me in PACS.        PROCEDURES  Critical Care  Performed by: Matthew Delgado MD  Authorized by: Matthew Delgado MD     Critical care provider statement:     Critical care time (minutes):  25    Critical care time was exclusive of:  Separately billable procedures and treating other patients    Critical care was necessary to treat or prevent imminent or life-threatening deterioration of the following conditions: STEMI.    Critical care was time spent personally by me on the following activities:  Development of treatment plan with patient or surrogate, discussions with consultants, evaluation of patient's response to treatment, examination of patient, obtaining history from patient or surrogate, ordering and performing treatments and interventions, ordering and review of laboratory studies, ordering and review of radiographic studies, pulse oximetry, re-evaluation of patient's condition and review of old charts        EKG        Obtained from EMS and transmitted prior to pt arrival.     EKG time: 0341  Rhythm/Rate: NSR 70s BPM  P waves and NV: normal  QRS, axis: normal   ST and T waves: ST elevation 5mm 2,3, and AVF with reciprocal depression 5mm in AVL V1-V3.     Interpreted Contemporaneously by me, independently viewed    EKG          EKG time: 0405  Rhythm/Rate: NSR 83 BPM  P waves and NV: normal  QRS, axis: normal   ST and T waves: 6-7mm of ST elevation in 2, 3, and AVF with reciprocal changes in AVR, AVL, V1-V4.      Interpreted Contemporaneously by me, independently viewed  Worsening compared to prior obtained PTA by EMS.       PROGRESS AND CONSULTS     0348-Received EKG from EMS prior to pt arrival. EKG-acute inferior MI. Cath lab activated and interventional cardiologist called.     0351-Discussed pt case with  (interventional cardiology) who agrees to activate cath lab and ask that I assess for infectious sources upon pt arrival.     0252-Pt arrived to ED with EMS.     Patient does not present with complaints for  COVID19. However, my scribe and I were both wearing masks throughout any patient interaction.     0353-Informed pt that he is having an inferior MI seen on EKG. Informed him that I have spoken to interventional cardiology and they will take him for cardiac cath tonight. Discussed plan to order plavix and heparin but hold NTG and metoprolol due to inferior MI. Pt has had full ASA PTA. The patient indicates understanding of these issues and agrees with the plan.    0417-Cath lab is ready. Pt will be taken up at this time.       MEDICAL DECISION MAKING  Results were reviewed/discussed with the patient and they were also made aware of online access. Pt also made aware that some labs, such as cultures, will not be resulted during ER visit and follow up with PMD is necessary.     MDM  Number of Diagnoses or Management Options  Acute ST elevation myocardial infarction (STEMI) involving other coronary artery of inferior wall (CMS/HCC):      Amount and/or Complexity of Data Reviewed  Clinical lab tests: reviewed and ordered (Please refer to lab section )  Tests in the radiology section of CPT®: reviewed and ordered (CXR-negative acute)  Tests in the medicine section of CPT®: reviewed and ordered (See EKG procedure note. )  Decide to obtain previous medical records or to obtain history from someone other than the patient: yes  Discuss the patient with other providers: yes ( (interventional cardiology))  Independent visualization of images, tracings, or specimens: yes    Critical Care  Total time providing critical care: < 30 minutes         DIAGNOSIS  Final diagnoses:   Acute ST elevation myocardial infarction (STEMI) involving other coronary artery of inferior wall (CMS/HCC)       DISPOSITION  To the cath lab    Latest Documented Vital Signs:  As of 04:17  BP- (!) 167/106 HR- 86 Temp- 96.5 °F (35.8 °C) O2 sat- 96%    --  Documentation assistance provided by christy Jara for MD Estefany.  Information  recorded by the scribe was done at my direction and has been verified and validated by me.     Ruby Jara  04/03/20 0350       Matthew Delgado MD  04/03/20 0934

## 2020-04-03 NOTE — PLAN OF CARE
Problem: Patient Care Overview  Goal: Plan of Care Review  Outcome: Ongoing (interventions implemented as appropriate)  Flowsheets (Taken 4/3/2020 0701)  Progress: improving  Plan of Care Reviewed With: patient  Outcome Summary: Post cath lab, pt AAOx4, pt calm cooperative. No SOB, no CP. Pressure band still on, notches let off but when taken all the way off still bleeding a little bit. Pt hoping to see his wife either in person or facetime.  RN

## 2020-04-03 NOTE — CONSULTS
"Adult Nutrition  Assessment/PES    Patient Name:  Kieran Hdez  YOB: 1955  MRN: [id number]  Admit Date:  4/3/2020    Assessment Date:  4/3/2020    Comments:  Pt is a 64 y.o. male who presents complaining of cp s/p STEMI/ He is on a HHD/CARB diet. BMI 18.72. Pt ate well this am for breakfast. Menu provided.     Reason for Assessment     Row Name 04/03/20 1159          Reason for Assessment    Reason For Assessment  identified at risk by screening criteria     Diagnosis  -- STEMI myocardial infarction, HTN, hyperlipidema         Nutrition/Diet History     Row Name 04/03/20 1200          Nutrition/Diet History    Typical Food/Fluid Intake  ate well this am for breakfast 100%         Anthropometrics     Row Name 04/03/20 1200 04/03/20 0919       Anthropometrics    Height  --  182 cm (71.65\")    Weight  --  62 kg (136 lb 11 oz)       Ideal Body Weight (IBW)    Ideal Body Weight (IBW) (kg)  --  81.1    % Ideal Body Weight  --  76.45       Usual Body Weight (UBW)    Weight Loss Time Frame  wt stable x 6 mo, has lost 15lbs x 6 years  --       Body Mass Index (BMI)    BMI (kg/m2)  --  18.76    BMI Assessment  BMI 18.5-24.9: normal  --        Labs/Tests/Procedures/Meds     Row Name 04/03/20 1201          Labs/Procedures/Meds    Lab Results Reviewed  reviewed     Lab Results Comments  na, wbc, h/h        Diagnostic Tests/Procedures    Diagnostic Test/Procedure Reviewed  reviewed        Medications    Pertinent Medications Reviewed  reviewed     Pertinent Medications Comments  IVs         Physical Findings     Row Name 04/03/20 1203          Physical Findings    Skin  -- zara 21         Estimated/Assessed Needs     Row Name 04/03/20 1203 04/03/20 0919       Calculation Measurements    Weight Used For Calculations  62 kg (136 lb 11 oz)  --    Height  --  182 cm (71.65\")       Estimated/Assessed Needs    Additional Documentation  KCAL/KG (Group);Protein Requirements (Group);Fluid Requirements (Group)  --       " KCAL/KG    KCAL/KG  30 Kcal/Kg (kcal);35 Kcal/Kg (kcal)  --    30 Kcal/Kg (kcal)  1860  --    35 Kcal/Kg (kcal)  2170  --       Protein Requirements    Weight Used For Protein Calculations  62 kg (136 lb 11 oz)  --    Est Protein Requirement Amount (gms/kg)  1.0 gm protein  --    Estimated Protein Requirements (gms/day)  62  --       Fluid Requirements    Estimated Fluid Requirements (mL/day)  1860  --    RDA Method (mL)  1860  --    Jerome-Nealar Method (over 20 kg)  2740  --        Nutrition Prescription Ordered     Row Name 04/03/20 1204          Nutrition Prescription PO    Common Modifiers  Cardiac;Consistent Carbohydrate         Evaluation of Received Nutrient/Fluid Intake     Row Name 04/03/20 1204          PO Evaluation    % PO Intake  good po 100% this am               Problem/Interventions:  Problem 1     Row Name 04/03/20 1204          Nutrition Diagnoses Problem 1    Problem 1  Impaired Nutrient Utilization     Etiology (related to)  Medical Diagnosis               Intervention Goal     Row Name 04/03/20 1208          Intervention Goal    General  Maintain nutrition     PO  PO intake (%)     PO Intake %  80 %     Weight  Maintain weight         Nutrition Intervention     Row Name 04/03/20 1208          Nutrition Intervention    RD/Tech Action  Care plan reviewd;Follow Tx progress;Menu provided         Nutrition Prescription     Row Name 04/03/20 1208          Nutrition Prescription PO    PO Prescription  Begin/change supplement     Supplement  Mighty Shake     Supplement Frequency  Daily         Education/Evaluation     Row Name 04/03/20 1209          Education    Education  Will Instruct as appropriate        Monitor/Evaluation    Monitor  Per protocol           Electronically signed by:  Delaney Sweet RD  04/03/20 12:10

## 2020-04-03 NOTE — PLAN OF CARE
Problem: Patient Care Overview  Goal: Plan of Care Review  Outcome: Ongoing (interventions implemented as appropriate)  Flowsheets  Taken 4/3/2020 1815  Progress: improving  Outcome Summary: Post cath lab with stent in RCA, PDA. Right radial site has occulsize dressing, clean, soft, palpable pulses. No complaints since moving to CVU. Wife would like updates on plan of care and discharge teaching once available. Pt is doing well. VSS.  Taken 4/3/2020 1630  Plan of Care Reviewed With: patient

## 2020-04-03 NOTE — H&P
Date of Hospital Visit: 20   Encounter Provider: Juan Odonnell MD  Place of Service: [facility] CARDIOLOGY  Patient Name: Kieran Hdez  :1955        Chief complaint  Inferoposterior myocardial infarction  Coronary disease  Tobacco abuse    History of Present Illness   64-year-old male with a medical history of hypertension hyperlipidemia, and tobacco abuse along with a family history of early myocardial infarction who presented with around 1-1/2 hours of severe chest pressure that woke him from sleep.  He reports mild diaphoresis but no nausea or vomiting.  No fever or cough.  No recent sick contacts.  His father and brother have both had a myocardial infarction.  They have remained in their house recently for 2 weeks quarantine, however not because they have symptoms.    Patient was noted have an inferior posterior myocardial infarction subsequently transferred to the Cath Lab for coronary angiography.  This showed a occluded mid to distal right coronary artery along with a severe lesion in the mid RCA and small caliber PDA branch distally.  He underwent PCI to the distal RCA and mid RCA with 4 mm drug-eluting stent, postdilated with a 4.5 mm NC balloon.  He tolerated this well.  He has no chest pain currently.    Past Medical History:   Diagnosis Date   • Hypertension        Past Surgical History:   Procedure Laterality Date   • HERNIA REPAIR         Medications Prior to Admission   Medication Sig Dispense Refill Last Dose   • amLODIPine (NORVASC) 10 MG tablet Take 1 tablet by mouth Daily. 90 tablet 3 Taking   • atorvastatin (LIPITOR) 10 MG tablet Take 1 tablet by mouth Daily. 90 tablet 3 Taking   • lisinopril (PRINIVIL,ZESTRIL) 10 MG tablet Take 1 tablet by mouth Daily. 90 tablet 3 Taking   • senna-docusate sodium (SENOKOT-S) 8.6-50 MG tablet Take 2 tablets by mouth Daily. 60 tablet 5 Taking       Current Meds  Scheduled Meds:  [MAR Hold] clopidogrel 600 mg Oral  "Once   [MAR Hold] heparin (porcine) 60 Units/kg Intravenous Once     Continuous Infusions:  sodium chloride  Last Rate: 125 mL/hr (04/03/20 0425)     PRN Meds:.•  adenosine (ADENOSCAN) 6 mg in sodium chloride 0.9% 100 mL MIXTURE  •  fentaNYL citrate (PF)  •  heparin (porcine)  •  iopamidol  •  lidocaine  •  midazolam  •  nitroglycerin  •  phenylephrine  •  [MAR Hold] sodium chloride  •  sodium chloride  •  verapamil    Allergies as of 04/03/2020   • (No Known Allergies)       Social History     Socioeconomic History   • Marital status:      Spouse name: Not on file   • Number of children: Not on file   • Years of education: Not on file   • Highest education level: Not on file   Tobacco Use   • Smoking status: Current Every Day Smoker     Packs/day: 1.00     Types: Cigarettes   • Smokeless tobacco: Never Used   Substance and Sexual Activity   • Alcohol use: No     Frequency: Never   • Drug use: No   • Sexual activity: Yes     Partners: Female       Family History   Problem Relation Age of Onset   • Emphysema Mother    • COPD Mother    • Heart attack Father    • COPD Brother    • Emphysema Brother        REVIEW OF SYSTEMS:   12 point ROS was performed and is negative except as outlined in HPI        Objective:   Temp:  [96.5 °F (35.8 °C)] 96.5 °F (35.8 °C)  Heart Rate:  [86-88] 86  Resp:  [18-22] 18  BP: (167)/(106) 167/106  Body mass index is 18.61 kg/m².  Flowsheet Rows      First Filed Value   Admission Height  182.9 cm (72\") Documented at 04/03/2020 0401   Admission Weight  62.2 kg (137 lb 3.2 oz) Documented at 04/03/2020 0401        Vitals:    04/03/20 0510   BP:    Pulse:    Resp: 18   Temp:    SpO2:      Temp:  [96.5 °F (35.8 °C)] 96.5 °F (35.8 °C)  Heart Rate:  [86-88] 86  Resp:  [18-22] 18  BP: (167)/(106) 167/106    Intake/Output Summary (Last 24 hours) at 4/3/2020 0515  Last data filed at 4/3/2020 0508  Gross per 24 hour   Intake 600 ml   Output --   Net 600 ml     Flowsheet Rows      First Filed " "Value   Admission Height  182.9 cm (72\") Documented at 04/03/2020 0401   Admission Weight  62.2 kg (137 lb 3.2 oz) Documented at 04/03/2020 0401          General Appearance:    Alert, cooperative, in no acute distress   Head:    Normocephalic, without obvious abnormality, atraumatic       Neck:   No adenopathy, supple, no thyromegaly, no carotid bruit, no    JVD   Lungs:     Clear to auscultation bilaterally, no wheezes, rales, or     rhonchi    Heart:    Normal rate, regular rhythm, no murmur, no rub, no gallop   Chest Wall:    No abnormalities observed   Abdomen:     Normal bowel sounds, soft, nontender, nondistended,            no rebound tenderness   Extremities:   No cyanosis, clubbing, or edema   Pulses:   Pulses palpable and equal bilaterally   Skin:   No bleeding or rash       Neurologic:   Cranial nerves 2 - 12 grossly intact, sensation intact                   Lab Review:    Results from last 7 days   Lab Units 04/03/20  0401   SODIUM mmol/L 140   POTASSIUM mmol/L 3.8   CHLORIDE mmol/L 104   CO2 mmol/L 20.8*   BUN mg/dL 17   CREATININE mg/dL 0.99   CALCIUM mg/dL 8.4*   BILIRUBIN mg/dL 0.2   ALK PHOS U/L 111   ALT (SGPT) U/L 11   AST (SGOT) U/L 11   GLUCOSE mg/dL 138*     Results from last 7 days   Lab Units 04/03/20  0401   TROPONIN T ng/mL <0.010     @LABRCNTbnp@  Results from last 7 days   Lab Units 04/03/20  0401   WBC 10*3/mm3 10.02   HEMOGLOBIN g/dL 14.9   HEMATOCRIT % 45.1   PLATELETS 10*3/mm3 188     Results from last 7 days   Lab Units 04/03/20  0401   INR  1.10     Results from last 7 days   Lab Units 04/03/20  0401   MAGNESIUM mg/dL 1.9     I personally viewed and interpreted the patient's EKG/Telemetry data  )  Patient Active Problem List   Diagnosis   • BP (high blood pressure)   • Cigarette smoker   • Hyperlipidemia   • High risk medication use   • Acute myocardial infarction (CMS/HCC)     Assessment and Plan:  1.  Inferoposterior STEMI  -I think it is reasonable to continue aspirin and " Plavix  - Continue atorvastatin but increase to 40 mg p.o. nightly  - Add low-dose metoprolol tartrate 25 mg p.o. twice daily  - Transthoracic echocardiogram will be ordered  - Routine post myocardial infarction CCU care    2.  Hyperlipidemia: Atorvastatin as above  -L DL will be measured tomorrow    3.  Essential hypertension: We will monitor and add back lisinopril if his blood pressure tolerates        Juan Odonnell MD  04/03/20  05:15.  Time spent in reviewing chart, discussion and examination:

## 2020-04-04 VITALS
OXYGEN SATURATION: 96 % | HEIGHT: 72 IN | WEIGHT: 136.69 LBS | BODY MASS INDEX: 18.51 KG/M2 | HEART RATE: 57 BPM | RESPIRATION RATE: 16 BRPM | DIASTOLIC BLOOD PRESSURE: 86 MMHG | TEMPERATURE: 97.4 F | SYSTOLIC BLOOD PRESSURE: 137 MMHG

## 2020-04-04 LAB
ANION GAP SERPL CALCULATED.3IONS-SCNC: 11 MMOL/L (ref 5–15)
BUN BLD-MCNC: 19 MG/DL (ref 8–23)
BUN/CREAT SERPL: 20.9 (ref 7–25)
CALCIUM SPEC-SCNC: 8.8 MG/DL (ref 8.6–10.5)
CHLORIDE SERPL-SCNC: 104 MMOL/L (ref 98–107)
CO2 SERPL-SCNC: 21 MMOL/L (ref 22–29)
CREAT BLD-MCNC: 0.91 MG/DL (ref 0.76–1.27)
DEPRECATED RDW RBC AUTO: 43.2 FL (ref 37–54)
ERYTHROCYTE [DISTWIDTH] IN BLOOD BY AUTOMATED COUNT: 13.7 % (ref 12.3–15.4)
GFR SERPL CREATININE-BSD FRML MDRD: 83 ML/MIN/1.73
GLUCOSE BLD-MCNC: 89 MG/DL (ref 65–99)
HCT VFR BLD AUTO: 40.6 % (ref 37.5–51)
HGB BLD-MCNC: 13.4 G/DL (ref 13–17.7)
MCH RBC QN AUTO: 28.6 PG (ref 26.6–33)
MCHC RBC AUTO-ENTMCNC: 33 G/DL (ref 31.5–35.7)
MCV RBC AUTO: 86.6 FL (ref 79–97)
PLATELET # BLD AUTO: 143 10*3/MM3 (ref 140–450)
PMV BLD AUTO: 10.7 FL (ref 6–12)
POTASSIUM BLD-SCNC: 4.5 MMOL/L (ref 3.5–5.2)
RBC # BLD AUTO: 4.69 10*6/MM3 (ref 4.14–5.8)
SODIUM BLD-SCNC: 136 MMOL/L (ref 136–145)
WBC NRBC COR # BLD: 8.68 10*3/MM3 (ref 3.4–10.8)

## 2020-04-04 PROCEDURE — 99239 HOSP IP/OBS DSCHRG MGMT >30: CPT | Performed by: INTERNAL MEDICINE

## 2020-04-04 PROCEDURE — 85027 COMPLETE CBC AUTOMATED: CPT | Performed by: INTERNAL MEDICINE

## 2020-04-04 PROCEDURE — 93005 ELECTROCARDIOGRAM TRACING: CPT | Performed by: INTERNAL MEDICINE

## 2020-04-04 PROCEDURE — 80048 BASIC METABOLIC PNL TOTAL CA: CPT | Performed by: INTERNAL MEDICINE

## 2020-04-04 PROCEDURE — 93010 ELECTROCARDIOGRAM REPORT: CPT | Performed by: INTERNAL MEDICINE

## 2020-04-04 RX ORDER — CLOPIDOGREL BISULFATE 75 MG/1
75 TABLET ORAL DAILY
Qty: 30 TABLET | Refills: 11 | Status: SHIPPED | OUTPATIENT
Start: 2020-04-04 | End: 2021-04-14 | Stop reason: SDUPTHER

## 2020-04-04 RX ORDER — METOPROLOL SUCCINATE 25 MG/1
25 TABLET, EXTENDED RELEASE ORAL DAILY
Qty: 30 TABLET | Refills: 11 | Status: SHIPPED | OUTPATIENT
Start: 2020-04-04 | End: 2021-01-25 | Stop reason: SDUPTHER

## 2020-04-04 RX ORDER — ASPIRIN 81 MG/1
81 TABLET, CHEWABLE ORAL DAILY
Qty: 30 TABLET | Refills: 11 | Status: SHIPPED | OUTPATIENT
Start: 2020-04-04 | End: 2022-07-22

## 2020-04-04 RX ORDER — LISINOPRIL 5 MG/1
5 TABLET ORAL DAILY
Qty: 30 TABLET | Refills: 11 | Status: SHIPPED | OUTPATIENT
Start: 2020-04-04 | End: 2020-05-04 | Stop reason: SDUPTHER

## 2020-04-04 RX ORDER — ATORVASTATIN CALCIUM 40 MG/1
40 TABLET, FILM COATED ORAL DAILY
Qty: 30 TABLET | Refills: 6 | Status: SHIPPED | OUTPATIENT
Start: 2020-04-04 | End: 2020-10-28 | Stop reason: SDUPTHER

## 2020-04-04 RX ADMIN — ASPIRIN 81 MG: 81 TABLET, CHEWABLE ORAL at 08:56

## 2020-04-04 RX ADMIN — CLOPIDOGREL 75 MG: 75 TABLET, FILM COATED ORAL at 08:56

## 2020-04-04 RX ADMIN — ATORVASTATIN CALCIUM 40 MG: 20 TABLET, FILM COATED ORAL at 08:56

## 2020-04-04 RX ADMIN — METOPROLOL TARTRATE 25 MG: 25 TABLET ORAL at 08:56

## 2020-04-04 NOTE — PLAN OF CARE
Problem: Patient Care Overview  Goal: Plan of Care Review  Outcome: Ongoing (interventions implemented as appropriate)  Flowsheets (Taken 4/4/2020 9095)  Progress: improving  Plan of Care Reviewed With: patient  Outcome Summary: VSS. SR 50-60s. No complaints of pain. RA. Will continue to monitor.  Goal: Individualization and Mutuality  Outcome: Ongoing (interventions implemented as appropriate)  Goal: Discharge Needs Assessment  Outcome: Ongoing (interventions implemented as appropriate)  Goal: Interprofessional Rounds/Family Conf  Outcome: Ongoing (interventions implemented as appropriate)     Problem: Cardiac Catheterization (Diagnostic/Interventional) (Adult)  Goal: Signs and Symptoms of Listed Potential Problems Will be Absent, Minimized or Managed (Cardiac Catheterization)  Outcome: Ongoing (interventions implemented as appropriate)  Flowsheets (Taken 4/4/2020 9182)  Problems Assessed (Cardiac Catheterization): all  Problems Present (Cardiac Cath): situational response     Problem: Cardiac: ACS (Acute Coronary Syndrome) (Adult)  Goal: Signs and Symptoms of Listed Potential Problems Will be Absent, Minimized or Managed (Cardiac: ACS)  Outcome: Ongoing (interventions implemented as appropriate)  Flowsheets (Taken 4/4/2020 9466)  Problems Assessed (Acute Coronary Syndrome): all  Problems Present (Acute Coronary Syn): situational response

## 2020-04-04 NOTE — PROGRESS NOTES
Pharmacy Services: STEMI Medication Review    Kieran Lemuel is s/p PCI with drug-eluting stent placement due to STEMI. Pharmacy to review discharge medications to make sure appropriate medications have been prescribed.    Patient has been discharged on the following:  · P2Y12 Inhibitor: Clopidogrel 75 mg daily  · Aspirin 81 mg daily  · High dose Statin: Atorvastatin 40 mg daily  · Beta-blocker: Metoprolol XL 25 mg daily    These medications meet the requirements for NCDR discharge medication for chest pain and MI.    Preethi HerreraD., [facility]  Clinical Staff Pharmacist

## 2020-04-04 NOTE — DISCHARGE SUMMARY
Date of Discharge:  4/4/2020  Date of Admit: 4/3/2020    Discharge Diagnosis:  Inferior-posterior/lateral MI  Coronary artery disease  Tobacco abuse  Ischemic cardiomyopathy  Essential hypertension  Hyperlipidemia    Hospital Course: 64-year-old male with a medical history of hypertension hyperlipidemia, and tobacco abuse along with a family history of early myocardial infarction who presented with around 1-1/2 hours of severe chest pressure that woke him from sleep.  He reports mild diaphoresis but no nausea or vomiting.  No fever or cough.  No recent sick contacts.  His father and brother have both had a myocardial infarction.  They have remained in their house recently for 2 weeks quarantine, however not because they have symptoms.     Patient was noted have an inferior posterior myocardial infarction subsequently transferred to the Cath Lab for coronary angiography.  This showed a occluded mid to distal right coronary artery along with a severe lesion in the mid RCA and small caliber PDA branch distally.  He underwent PCI to the distal RCA and mid RCA with 4 mm drug-eluting stent, postdilated with a 4.5 mm NC balloon.  He was noted to have a mild ischemic cardiomyopathy.  He was placed on Toprol along with decreased dose of lisinopril secondary to lower blood pressures during his inpatient stay.  Appropriate for discharge home today on dual antiplatelet therapy.    Procedures Performed  Procedure(s):  Left Heart Cath  Stent KEVIN coronary  Coronary angiography  Left ventriculography      4/3/2020  Conclusions:   1. Left main: Normal  2. LAD: 30 to 40% proximal stenosis  3. LCX: Small caliber.  Normal.  4. RCA: Acutely occluded distal segment.  80% mid vessel stenosis.  Small caliber PDA with discrete 95% distal stenosis.  5.  Normal left ventricular size and systolic function.  Inferior wall akinesis  6.  Successful PCI of the mid and distal RCA with 4.0 x 18 mm Xience Aisha drug-eluting stent in both segments.  " Postdilated with a 4.5 mm noncompliant trek balloon    Consults     Date and Time Order Name Status Description    4/3/2020 0356 Consult Interventional Cardiology and Notify Cath Lab            Pertinent Test Results  · Calculated EF = 45.0%. Estimated EF was in disagreement with the calculated EF. Estimated EF = 40%. Normal left ventricular cavity size and wall thickness noted. Global left ventricular wall motion appears abnormal. Left ventricular diastolic dysfunction is noted (grade I) consistent with impaired relaxation. There is mild-moderate left ventricular systolic dysfunction.  · See wall scoring diagram.         Discharge Physical Exam:  Temp:  [97.4 °F (36.3 °C)-98.6 °F (37 °C)] 97.4 °F (36.3 °C)  Heart Rate:  [53-76] 57  Resp:  [16-18] 16  BP: (114-147)/(75-99) 137/86    Intake/Output Summary (Last 24 hours) at 4/4/2020 0833  Last data filed at 4/4/2020 0700  Gross per 24 hour   Intake 650 ml   Output 400 ml   Net 250 ml     Flowsheet Rows      First Filed Value   Admission Height  182.9 cm (72\") Documented at 04/03/2020 0401   Admission Weight  62.2 kg (137 lb 3.2 oz) Documented at 04/03/2020 0401          General Appearance:    Alert, cooperative, in no acute distress   Head:    Normocephalic, without obvious abnormality, atraumatic       Neck:   No adenopathy, supple, no thyromegaly, no carotid bruit, no    JVD   Lungs:     Clear to auscultation bilaterally, no wheezes, rales, or     rhonchi    Heart:    Normal rate, regular rhythm, no murmur, no rub, no gallop   Chest Wall:    No abnormalities observed   Abdomen:     Normal bowel sounds, soft, nontender, nondistended,            no rebound tenderness   Extremities:   No cyanosis, clubbing, or edema   Pulses:   Pulses palpable and equal bilaterally   Skin:   No bleeding or rash       Neurologic:   Cranial nerves 2 - 12 grossly intact, sensation intact             Discharge Medications     Discharge Medications      New Medications      Instructions " Start Date   aspirin 81 MG chewable tablet   81 mg, Oral, Daily      clopidogrel 75 MG tablet  Commonly known as:  PLAVIX   75 mg, Oral, Daily      metoprolol succinate XL 25 MG 24 hr tablet  Commonly known as:  TOPROL-XL   25 mg, Oral, Daily         Changes to Medications      Instructions Start Date   atorvastatin 40 MG tablet  Commonly known as:  LIPITOR  What changed:    · medication strength  · how much to take   40 mg, Oral, Daily      lisinopril 5 MG tablet  Commonly known as:  PRINIVIL,ZESTRIL  What changed:    · medication strength  · how much to take   5 mg, Oral, Daily         Continue These Medications      Instructions Start Date   sennosides-docusate 8.6-50 MG per tablet  Commonly known as:  PERICOLACE   2 tablets, Oral, Daily         Stop These Medications    amLODIPine 10 MG tablet  Commonly known as:  NORVASC            Discharge Diet: cardiac    Activity at Discharge: ad cameron    Discharge disposition: home    Condition on Discharge: good    Follow-up Appointments  Future Appointments   Date Time Provider Department Center   6/4/2020 11:15 AM Giancarlo Guadalupe MD [facility]  [facility] None     Additional Instructions for the Follow-ups that You Need to Schedule     Ambulatory Referral to Cardiac Rehab   As directed      Discharge Follow-up with Specialty: norberto mendoza: telehealth; 1 Week   As directed      Specialty:  norberto mendoza: telehealth    Follow Up:  1 Week         Discharge Follow-up with Specialty: monster; 1 Month   As directed      Specialty:  [facility]    Follow Up:  1 Month               Test Results Pending at Discharge       Juan Odonnell MD  04/04/20  08:33    Greater than 30 min spent in reviewing records, discussion and examination of the patient and discussion with other members of the patient's medical team.     Dictated utilizing Dragon dictation

## 2020-04-05 ENCOUNTER — READMISSION MANAGEMENT (OUTPATIENT)
Dept: CALL CENTER | Facility: HOSPITAL | Age: 65
End: 2020-04-05

## 2020-04-05 NOTE — OUTREACH NOTE
Prep Survey      Responses   [facility] facility patient discharged from?  [place]   Is LACE score < 7 ?  Yes   Eligibility  Readm Mgmt   Discharge diagnosis  Inferior-posterior/lateral MI   Does the patient have one of the following disease processes/diagnoses(primary or secondary)?  Acute MI (STEMI,NSTEMI)   Does the patient have Home health ordered?  No   Is there a DME ordered?  No   Prep survey completed?  Yes          Madelyn Staton RN

## 2020-04-07 ENCOUNTER — READMISSION MANAGEMENT (OUTPATIENT)
Dept: CALL CENTER | Facility: HOSPITAL | Age: 65
End: 2020-04-07

## 2020-04-07 LAB
ACT BLD: 235 SECONDS (ref 82–152)
ACT BLD: 285 SECONDS (ref 82–152)

## 2020-04-07 NOTE — OUTREACH NOTE
AMI Week 1 Survey      Responses   [facility]   Does the patient have one of the following disease processes/diagnoses(primary or secondary)?  Acute MI (STEMI,NSTEMI)   Is there a successful TCM telephone encounter documented?  No   Week 1 attempt successful?  Yes   Call start time  1511   Call end time  1529   Discharge diagnosis  Inferior-posterior/lateral MI   Is patient permission given to speak with other caregiver?  No   Meds reviewed with patient/caregiver?  Yes   Is the patient having any side effects they believe may be caused by any medication additions or changes?  Yes   Side effects comments   Patient reports that he sometimes feels lightheaded when standing up.    Does the patient have all prescriptions related to this admission filled (includes statins,anticoagulants,HTN meds,anti-arrhythmia meds)  Yes   Is the patient taking all medications as directed (includes completed medication regime)?  Yes   Medication comments  Advised to monitor blood pressure / HR twice a day at home and keep log to report to provider with telehealth appt.    Does the patient have a primary care provider?   Yes   Does the patient have an appointment with their PCP,cardiologist,or clinic within 7 days of discharge?  Greater than 7 days   Comments regarding PCP  F/U with Dr Guadalupe PCP   Nursing Interventions  Verified appointment date/time/provider   Comments  Patient to follow up with Cha Monahan per telehealth.    Has home health visited the patient within 72 hours of discharge?  N/A   Psychosocial issues?  No   Did the patient receive a copy of their discharge instructions?  Yes   Nursing interventions  Reviewed instructions with patient   What is the patient's perception of their health status since discharge?  Improving   Is the patient/caregiver able to teach back signs and symptoms of when to call for help immediately:  Sudden discomfort in arms, back, neck or jaw, Sudden  chest discomfort, Sudden sweating or clammy skin, Nausea or vomiting, Shortness of breath at any time, Irregular or rapid heart rate, Dizziness or lightheadedness   Is the patient/caregiver able to teach back lifestyle changes to help prevent MIs  Regular exercise as approved by provider   Is the patient/caregiver able to teach back ways to prevent a second heart attack:  Take medications, Follow up with MD   Is the patient/caregiver able to teach back the hierarchy of who to call/visit for symptoms/problems? PCP, Specialist, Home health nurse, Urgent Care, ED, 911  Yes   Week 1 call completed?  Yes          Nereida Mariee RN

## 2020-04-09 ENCOUNTER — NURSE TRIAGE (OUTPATIENT)
Dept: CALL CENTER | Facility: HOSPITAL | Age: 65
End: 2020-04-09

## 2020-04-09 NOTE — TELEPHONE ENCOUNTER
"Questions concerning follow up    Reason for Disposition  • Health Information question, no triage required and triager able to answer question    Additional Information  • Negative: [1] Caller is not with the adult (patient) AND [2] reporting urgent symptoms  • Negative: Lab result questions  • Negative: Medication questions  • Negative: Caller can't be reached by phone  • Negative: Caller has already spoken to PCP or another triager  • Negative: RN needs further essential information from caller in order to complete triage  • Negative: Requesting regular office appointment  • Negative: [1] Caller requesting NON-URGENT health information AND [2] PCP's office is the best resource    Answer Assessment - Initial Assessment Questions  1. REASON FOR CALL or QUESTION: \"What is your reason for calling today?\" or \"How can I best help you?\" or \"What question do you have that I can help answer?\"      Needing information about f/u    Protocols used: INFORMATION ONLY CALL-ADULT-      "

## 2020-04-10 ENCOUNTER — TELEMEDICINE (OUTPATIENT)
Dept: CARDIOLOGY | Facility: CLINIC | Age: 65
End: 2020-04-10

## 2020-04-10 VITALS
SYSTOLIC BLOOD PRESSURE: 135 MMHG | DIASTOLIC BLOOD PRESSURE: 82 MMHG | HEIGHT: 72 IN | HEART RATE: 63 BPM | BODY MASS INDEX: 18.56 KG/M2 | WEIGHT: 137 LBS

## 2020-04-10 DIAGNOSIS — Z72.0 TOBACCO ABUSE: ICD-10-CM

## 2020-04-10 DIAGNOSIS — I21.11 ACUTE ST ELEVATION MYOCARDIAL INFARCTION (STEMI) INVOLVING RIGHT CORONARY ARTERY (HCC): ICD-10-CM

## 2020-04-10 DIAGNOSIS — E78.49 OTHER HYPERLIPIDEMIA: ICD-10-CM

## 2020-04-10 DIAGNOSIS — I10 ESSENTIAL HYPERTENSION: ICD-10-CM

## 2020-04-10 DIAGNOSIS — I25.10 CORONARY ARTERY DISEASE INVOLVING NATIVE CORONARY ARTERY OF NATIVE HEART WITHOUT ANGINA PECTORIS: Primary | ICD-10-CM

## 2020-04-10 PROCEDURE — 99214 OFFICE O/P EST MOD 30 MIN: CPT | Performed by: NURSE PRACTITIONER

## 2020-04-10 NOTE — PROGRESS NOTES
Date of Office Visit: 04/10/2020  Encounter Provider: ANDRÉS Hendricks  Place of Service: [facility] CARDIOLOGY  Patient Name: Kieran Hdez  :1955    Chief Complaint   Patient presents with   • STEMI     1 WK FOLLOW UP   :     HPI: Kieran Hdez is a 64 y.o. male who is new to me.  He is a patient with a past cardiac history significant for hypertension, hyperlipidemia, and tobacco abuse.  On 4/3/2020, he presented with severe chest pressure.  He was noted to have an inferior posterior STEMI and was subsequently taken to the cardiac catheterization lab.  This revealed a normal left main, 30 to 40% proximal LAD, acutely occluded distal RCA, 80% mid RCA, and a small caliber PDA with discrete 95% distal stenosis.  He had normal LV function with inferior wall akinesis.  He underwent successful PCI of the mid and distal RCA with 4.0 x 18 mm Xience Aisha drug-eluting stent in both segments.  This was postdilated with a 4.5 mm noncompliant trek balloon.  He was placed on dual antiplatelet therapy with aspirin and Plavix which he will continue for at least 1 year.  Post procedure echocardiogram revealed an EF of 40% and no significant valvular abnormalities.  He was placed on Toprol and his lisinopril was decreased to 5 mg due to low blood pressure.  His atorvastatin was increased to 40 mg.  On 2020 he was stable for discharge.  He has agreed to a telehealth visit for his 1 week follow-up.   Since being out of the hospital, he has been doing well from a cardiac standpoint.  He denies any chest pain, shortness of breath, palpitations, edema, dizziness, or syncope.  He denies any PND or orthopnea.  He denies any bleeding difficulties or melena.  He has not been doing much in the way of activity.  His blood pressures have been stable in the 110s to 140s systolic.  He reports some occasional dizziness upon standing.  He states that his radial site is healing well.  He has cut back on  his smoking from 1 pack to 1 to 2 cigarettes a day.    Past Medical History:   Diagnosis Date   • CAD (coronary artery disease)    • Hyperlipidemia    • Hypertension    • Ischemic cardiomyopathy    • MI (myocardial infarction) (CMS/[facility]) 04/2020    inferior posterior lateral   • Tobacco abuse        Past Surgical History:   Procedure Laterality Date   • CARDIAC CATHETERIZATION N/A 4/3/2020    Procedure: Left Heart Cath;  Surgeon: Juan Odonnell MD;  Location: [facility] CATH INVASIVE LOCATION;  Service: Cardiovascular;  Laterality: N/A;   • CARDIAC CATHETERIZATION N/A 4/3/2020    Procedure: Stent KEVIN coronary;  Surgeon: Juan Odonnell MD;  Location: [facility] CATH INVASIVE LOCATION;  Service: Cardiovascular;  Laterality: N/A;   • CARDIAC CATHETERIZATION N/A 4/3/2020    Procedure: Coronary angiography;  Surgeon: Juan Odonnell MD;  Location: [facility] CATH INVASIVE LOCATION;  Service: Cardiovascular;  Laterality: N/A;   • CARDIAC CATHETERIZATION N/A 4/3/2020    Procedure: Left ventriculography;  Surgeon: Juan Odonnell MD;  Location: [facility] CATH INVASIVE LOCATION;  Service: Cardiovascular;  Laterality: N/A;   • HERNIA REPAIR         Social History     Socioeconomic History   • Marital status:      Spouse name: Not on file   • Number of children: Not on file   • Years of education: Not on file   • Highest education level: Not on file   Tobacco Use   • Smoking status: Current Every Day Smoker     Packs/day: 0.25     Types: Cigarettes   • Smokeless tobacco: Never Used   • Tobacco comment: CAFFEINE USE: 4 CUPS COFFEE DAILY   Substance and Sexual Activity   • Alcohol use: No     Frequency: Never   • Drug use: No   • Sexual activity: Yes     Partners: Female       Family History   Problem Relation Age of Onset   • Emphysema Mother    • COPD Mother    • Heart attack Father    • COPD Brother    • Emphysema Brother        Review of Systems   Constitution: Negative for chills, fever and  "malaise/fatigue.   Cardiovascular: Negative for chest pain, dyspnea on exertion, leg swelling, near-syncope, orthopnea, palpitations, paroxysmal nocturnal dyspnea and syncope.   Respiratory: Negative for cough and shortness of breath.    Musculoskeletal: Negative for joint pain, joint swelling and myalgias.   Gastrointestinal: Negative for abdominal pain, diarrhea, melena, nausea and vomiting.   Genitourinary: Negative for frequency and hematuria.   Neurological: Negative for light-headedness, numbness, paresthesias and seizures.   Allergic/Immunologic: Negative.    All other systems reviewed and are negative.      No Known Allergies      Current Outpatient Medications:   •  aspirin 81 MG chewable tablet, Chew 1 tablet Daily., Disp: 30 tablet, Rfl: 11  •  atorvastatin (LIPITOR) 40 MG tablet, Take 1 tablet by mouth Daily., Disp: 30 tablet, Rfl: 6  •  clopidogrel (PLAVIX) 75 MG tablet, Take 1 tablet by mouth Daily., Disp: 30 tablet, Rfl: 11  •  DOCUSATE CALCIUM PO, Take  by mouth As Needed., Disp: , Rfl:   •  lisinopril (PRINIVIL,ZESTRIL) 5 MG tablet, Take 1 tablet by mouth Daily., Disp: 30 tablet, Rfl: 11  •  metoprolol succinate XL (TOPROL-XL) 25 MG 24 hr tablet, Take 1 tablet by mouth Daily., Disp: 30 tablet, Rfl: 11  •  Wheat Dextrin (BENEFIBER DRINK MIX PO), Take  by mouth As Needed., Disp: , Rfl:   •  senna-docusate sodium (SENOKOT-S) 8.6-50 MG tablet, Take 2 tablets by mouth Daily., Disp: 60 tablet, Rfl: 5      Objective:     Vitals:    04/10/20 1419   BP: 135/82   Pulse: 63   Weight: 62.1 kg (137 lb)   Height: 182.9 cm (72\")     Body mass index is 18.58 kg/m².    PHYSICAL EXAM:    Physical Exam   Constitutional: He is oriented to person, place, and time. He appears well-developed and well-nourished.   HENT:   Head: Normocephalic and atraumatic.   Neck: Normal range of motion.   Pulmonary/Chest: Effort normal.   Musculoskeletal: Normal range of motion.   Neurological: He is alert and oriented to person, place, " and time.   Skin: Skin is dry and intact.   The patient reports that his radial site is well-healed   Psychiatric: His speech is normal and behavior is normal. Judgment and thought content normal. Cognition and memory are normal.             Assessment:       Diagnosis Plan   1. Coronary artery disease involving native coronary artery of native heart without angina pectoris     2. Acute ST elevation myocardial infarction (STEMI) involving right coronary artery (CMS/HCC)     3. Tobacco abuse     4. Other hyperlipidemia     5. Essential hypertension       No orders of the defined types were placed in this encounter.         Plan:       1.  Coronary artery disease.  He is status post STEMI and drug-eluting stent placement to his mid and distal RCA.  He remains on dual antiplatelet therapy with aspirin and Plavix.  His atorvastatin was increased to high-dose 40 mg.  He will need a repeat lipid panel and LFTs in 3 months.        2.  Ischemic cardiomyopathy.  His ejection fraction was 40%.  He is on goal-directed medical therapy with Toprol and lisinopril.  He will need a repeat echocardiogram for reassessment of his LV function.  He denies any symptoms of heart failure.  We discussed the signs and symptoms of acute heart failure including a weight gain of 3 pounds in a 24-hour period or 5 pounds in 1 week, shortness of breath, or edema.  I advised him to call me should any of these arise.      3.  Tobacco abuse.  We discussed the importance of smoking cessation.  Fortunately, he has already cut back on his smoking and is down to 1 to 2 cigarettes a day.      4.  Hypertension.  His blood pressure is well controlled on current therapy.      Overall I think he is doing well from a cardiac standpoint.  He denies any symptoms of angina or heart failure.  I am not going to make any changes to his medical regimen, and he will follow-up with Dr. Odonnell on 5/18/2020 or sooner if needed.      This patient has consented to a  telehealth visit via video. The visit was scheduled as a video visit to comply with patient safety concerns in accordance with CDC recommendations.  All vitals recorded within this visit are reported by the patient.  I spent 25 minutes in total including but not limited to the 12 minutes spent in direct conversation with this patient.         As always, it has been a pleasure to participate in your patient's care.      Sincerely,         ANDRÉS Storey

## 2020-04-13 ENCOUNTER — READMISSION MANAGEMENT (OUTPATIENT)
Dept: CALL CENTER | Facility: HOSPITAL | Age: 65
End: 2020-04-13

## 2020-04-13 NOTE — OUTREACH NOTE
AMI Week 2 Survey      Responses   [facility] patient discharged from?  [place]   Does the patient have one of the following disease processes/diagnoses(primary or secondary)?  Acute MI (STEMI,NSTEMI)   Week 2 attempt successful?  Yes   Call start time  1137   Call end time  1150   Medication alerts for this patient  Pt has had phone visit with MD.   Meds reviewed with patient/caregiver?  Yes   Is the patient taking all medications as directed (includes completed medication regime)?  Yes   Medication comments  Pt continues to check bp and is wnl.   Does the patient have a primary care provider?   Yes   Has the patient kept scheduled appointments due by today?  Yes   What is the patient's perception of their health status since discharge?  Improving   Additional teach back comments  Pt is doing well. He has no complaints.   Week 2 call completed?  Yes          Gail Moreau RN

## 2020-04-21 ENCOUNTER — READMISSION MANAGEMENT (OUTPATIENT)
Dept: CALL CENTER | Facility: HOSPITAL | Age: 65
End: 2020-04-21

## 2020-04-21 NOTE — OUTREACH NOTE
AMI Week 3 Survey      Responses   [facility] patient discharged from?  [place]   Does the patient have one of the following disease processes/diagnoses(primary or secondary)?  Acute MI (STEMI,NSTEMI)   Week 3 attempt successful?  Yes   Call start time  0943   Call end time  0953   Discharge diagnosis  Inferior-posterior/lateral MI   Is the patient having any side effects they believe may be caused by any medication additions or changes?  No   Is the patient taking all medications as directed (includes completed medication regime)?  Yes   Medication comments  Also taking chantix to decrease smoking   Psychosocial issues?  No   Nursing interventions  Reviewed instructions with patient   What is the patient's perception of their health status since discharge?  Improving   Is the patient/caregiver able to teach back signs and symptoms of when to call for help immediately:  -- [Reviewed]   Is the patient/caregiver able to teach back lifestyle changes to help prevent MIs  Quit smoking, Regular exercise as approved by provider, Reducing stress [Doesn't drink]   Is the patient/caregiver able to teach back ways to prevent a second heart attack:  Take medications   Week 3 call completed?  Yes          Nereida Monaco RN

## 2020-04-29 ENCOUNTER — READMISSION MANAGEMENT (OUTPATIENT)
Dept: CALL CENTER | Facility: HOSPITAL | Age: 65
End: 2020-04-29

## 2020-04-29 NOTE — OUTREACH NOTE
AMI Week 4 Survey      Responses   [facility] patient discharged from?  [place]   Does the patient have one of the following disease processes/diagnoses(primary or secondary)?  Acute MI (STEMI,NSTEMI)   Week 4 attempt successful?  Yes   Call start time  1351   Call end time  1357   Discharge diagnosis  Inferior-posterior/lateral MI   Meds reviewed with patient/caregiver?  Yes   Is the patient taking all medications as directed (includes completed medication regime)?  Yes   Has the patient kept scheduled appointments due by today?  Yes   Is the patient still receiving Home Health Services?  N/A   What is the patient's perception of their health status since discharge?  Improving   Is the patient/caregiver able to teach back signs and symptoms of when to call for help immediately:  Sudden chest discomfort, Sudden discomfort in arms, back, neck or jaw, Shortness of breath at any time, Sudden sweating or clammy skin, Nausea or vomiting, Dizziness or lightheadedness, Irregular or rapid heart rate   Is the pateint /caregiver able to teach back the importance of cardiac rehab?  Yes   Nursing interventions  Provided education on importance of cardiac rehab   Is the patient/caregiver able to teach back lifestyle changes to help prevent MIs  Quit smoking, Heart healthy diet, Maintaining a healthy weight, Reducing stress, Regular exercise as approved by provider   If the patient is a current smoker, are they able to teach back resources for cessation?  Smoking cessation medications, Smoking cessation classes, Smoking cessation support groups   Week 4 call completed?  Yes   Would the patient like one additional call?  No   Graduated  Yes   Did the patient feel the follow up calls were helpful during their recovery period?  Yes   Was the number of calls appropriate?  Yes   Wrap up additional comments  Pt has stopped taking chantix and is smoking 2 cigarettes daily. Talked with patient about smoking cessation resources  available and that to continue to smoke puts his health at risk.            Teresa Ogden RN

## 2020-05-04 ENCOUNTER — TELEMEDICINE (OUTPATIENT)
Dept: CARDIOLOGY | Facility: CLINIC | Age: 65
End: 2020-05-04

## 2020-05-04 VITALS
BODY MASS INDEX: 19.23 KG/M2 | DIASTOLIC BLOOD PRESSURE: 83 MMHG | HEIGHT: 72 IN | HEART RATE: 70 BPM | WEIGHT: 142 LBS | SYSTOLIC BLOOD PRESSURE: 149 MMHG

## 2020-05-04 DIAGNOSIS — I10 ESSENTIAL HYPERTENSION: ICD-10-CM

## 2020-05-04 DIAGNOSIS — I42.8 NON-ISCHEMIC CARDIOMYOPATHY (HCC): Primary | ICD-10-CM

## 2020-05-04 DIAGNOSIS — I25.5 ISCHEMIC CARDIOMYOPATHY: ICD-10-CM

## 2020-05-04 DIAGNOSIS — I25.10 CORONARY ARTERY DISEASE INVOLVING NATIVE CORONARY ARTERY OF NATIVE HEART WITHOUT ANGINA PECTORIS: ICD-10-CM

## 2020-05-04 DIAGNOSIS — E78.5 HYPERLIPIDEMIA, UNSPECIFIED HYPERLIPIDEMIA TYPE: ICD-10-CM

## 2020-05-04 PROCEDURE — 99214 OFFICE O/P EST MOD 30 MIN: CPT | Performed by: INTERNAL MEDICINE

## 2020-05-04 RX ORDER — LISINOPRIL 10 MG/1
10 TABLET ORAL DAILY
Qty: 90 TABLET | Refills: 3 | Status: SHIPPED | OUTPATIENT
Start: 2020-05-04 | End: 2020-12-29 | Stop reason: SDUPTHER

## 2020-05-04 NOTE — PROGRESS NOTES
Date of Office Visit: 20  Encounter Provider: Juan Odonnell MD  Place of Service: [facility] CARDIOLOGY  Patient Name: Kieran Hdez  :1955  TELEHEALTH VISIT  VIDEO    Chief complaint  CAD  History of STEMI    HPI:  This patient has consented to a telehealth visit via phone. The visit was scheduled as a video visit to comply with patient safety concerns in accordance with CDC recommendations.  All vitals recorded within this visit are reported by the patient.  I spent  25 minutes in total including but not limited to the 15 minutes spent in direct conversation with this patient.     64 y.o. male  with a past cardiac history significant for hypertension, hyperlipidemia, and tobacco abuse.  On 4/3/2020, he presented with severe chest pressure.  He was noted to have an inferior posterior STEMI and was subsequently taken to the cardiac catheterization lab.  This revealed a normal left main, 30 to 40% proximal LAD, acutely occluded distal RCA, 80% mid RCA, and a small caliber PDA with discrete 95% distal stenosis.  He had normal LV function with inferior wall akinesis.  He underwent successful PCI of the mid and distal RCA with 4.0 x 18 mm Xience Aisha drug-eluting stent in both segments.  This was postdilated with a 4.5 mm noncompliant trek balloon.  He was placed on dual antiplatelet therapy with aspirin and Plavix which he will continue for at least 1 year.  Post procedure echocardiogram revealed an EF of 40% and no significant valvular abnormalities.  He was placed on Toprol and his lisinopril was decreased to 5 mg due to low blood pressure.  His atorvastatin was increased to 40 mg.  On 2020 he was stable for discharge.         Since that time, he has been doing very well.  He denies any chest pain or dyspnea on exertion.  No orthopnea or PND.  He has not had any recent bleeding complications on his anticoagulant therapy.  He overall, per his report, feels well.   His blood pressure has been mildly elevated and today, it is again up at 149/83.        Past Medical History:   Diagnosis Date   • CAD (coronary artery disease)    • Hyperlipidemia    • Hypertension    • Ischemic cardiomyopathy    • MI (myocardial infarction) (CMS/[facility]) 04/2020    inferior posterior lateral   • Tobacco abuse        Past Surgical History:   Procedure Laterality Date   • CARDIAC CATHETERIZATION N/A 4/3/2020    Procedure: Left Heart Cath;  Surgeon: Juan Odonnell MD;  Location:  [facility] CATH INVASIVE LOCATION;  Service: Cardiovascular;  Laterality: N/A;   • CARDIAC CATHETERIZATION N/A 4/3/2020    Procedure: Stent KEVIN coronary;  Surgeon: Juan Odonnell MD;  Location:  [facility] CATH INVASIVE LOCATION;  Service: Cardiovascular;  Laterality: N/A;   • CARDIAC CATHETERIZATION N/A 4/3/2020    Procedure: Coronary angiography;  Surgeon: Juan Odonnell MD;  Location:  [facility] CATH INVASIVE LOCATION;  Service: Cardiovascular;  Laterality: N/A;   • CARDIAC CATHETERIZATION N/A 4/3/2020    Procedure: Left ventriculography;  Surgeon: Juan Odonnell MD;  Location: [facility] CATH INVASIVE LOCATION;  Service: Cardiovascular;  Laterality: N/A;   • HERNIA REPAIR         Social History     Socioeconomic History   • Marital status:      Spouse name: Not on file   • Number of children: Not on file   • Years of education: Not on file   • Highest education level: Not on file   Tobacco Use   • Smoking status: Current Every Day Smoker     Packs/day: 0.25     Types: Cigarettes   • Smokeless tobacco: Never Used   • Tobacco comment: CAFFEINE USE: 4 CUPS COFFEE DAILY   Substance and Sexual Activity   • Alcohol use: No     Frequency: Never   • Drug use: No   • Sexual activity: Yes     Partners: Female       Family History   Problem Relation Age of Onset   • Emphysema Mother    • COPD Mother    • Heart attack Father    • COPD Brother    • Emphysema Brother        Review of Systems   Constitution: Negative  "for chills, fever and malaise/fatigue.   Cardiovascular: Negative for chest pain, dyspnea on exertion, leg swelling, near-syncope, orthopnea, palpitations, paroxysmal nocturnal dyspnea and syncope.   Respiratory: Negative for cough and shortness of breath.    Musculoskeletal: Negative for joint pain, joint swelling and myalgias.   Gastrointestinal: Negative for abdominal pain, diarrhea, melena, nausea and vomiting.   Genitourinary: Negative for frequency and hematuria.   Neurological: Negative for light-headedness, numbness, paresthesias and seizures.   Allergic/Immunologic: Negative.    All other systems reviewed and are negative.      No Known Allergies      Current Outpatient Medications:   •  aspirin 81 MG chewable tablet, Chew 1 tablet Daily., Disp: 30 tablet, Rfl: 11  •  atorvastatin (LIPITOR) 40 MG tablet, Take 1 tablet by mouth Daily., Disp: 30 tablet, Rfl: 6  •  clopidogrel (PLAVIX) 75 MG tablet, Take 1 tablet by mouth Daily., Disp: 30 tablet, Rfl: 11  •  DOCUSATE CALCIUM PO, Take  by mouth As Needed., Disp: , Rfl:   •  lisinopril (PRINIVIL,ZESTRIL) 5 MG tablet, Take 1 tablet by mouth Daily., Disp: 30 tablet, Rfl: 11  •  metoprolol succinate XL (TOPROL-XL) 25 MG 24 hr tablet, Take 1 tablet by mouth Daily., Disp: 30 tablet, Rfl: 11  •  senna-docusate sodium (SENOKOT-S) 8.6-50 MG tablet, Take 2 tablets by mouth Daily., Disp: 60 tablet, Rfl: 5  •  Wheat Dextrin (BENEFIBER DRINK MIX PO), Take  by mouth As Needed., Disp: , Rfl:       Objective:     Vitals:    05/04/20 1143   BP: 149/83   Pulse: 70   Weight: 64.4 kg (142 lb)   Height: 182.9 cm (72\")     Body mass index is 19.26 kg/m².    PHYSICAL EXAM:  Appears well.    No distress.    Normocephalic.    Normal respiratory effort.    No additional examination.    Telehealth secondary to COVID-19 outbreak.        Assessment:      No diagnosis found.  No orders of the defined types were placed in this encounter.         Plan:     Very pleasant 64-year-old male with " a medical history of coronary disease with ST elevation MI, PCI of the mid and distal RCA, ischemic cardiomyopathy with last ejection fraction of 40%, with tobacco abuse and hypertension, who presents back via telehealth evaluation.  He is doing very well and denies any chest pain or dyspnea.  He seems to be tolerating his current medical regimen.  His blood pressure, however, is not at goal.        1.  Coronary artery disease.  He is status post STEMI and drug-eluting stent placement to his mid and distal RCA.  He remains on dual antiplatelet therapy with aspirin and Plavix.  His atorvastatin was increased to high-dose 40 mg.      2.  Ischemic cardiomyopathy.  His ejection fraction was 40%.  He is on goal-directed medical therapy with Toprol and lisinopril.  He will need a repeat echocardiogram for reassessment of his LV function. This can be done in 3 mo. I have ordered     3.  Tobacco abuse.  We discussed the importance of smoking cessation.      4.  Hypertension: Blood pressure is not quite at goal.  I recommended he double his lisinopril therapy to 10 mg daily.    I will see him back in 6 months.

## 2020-07-06 ENCOUNTER — TELEPHONE (OUTPATIENT)
Dept: CARDIOLOGY | Facility: CLINIC | Age: 65
End: 2020-07-06

## 2020-07-06 DIAGNOSIS — E78.5 HYPERLIPIDEMIA, UNSPECIFIED HYPERLIPIDEMIA TYPE: Primary | ICD-10-CM

## 2020-07-06 NOTE — TELEPHONE ENCOUNTER
Advised pt and wife of getting labs done. Both stated he will have them done by Friday. We all verbalized understanding.    JULIO CESAR Lock

## 2020-07-08 ENCOUNTER — LAB (OUTPATIENT)
Dept: LAB | Facility: HOSPITAL | Age: 65
End: 2020-07-08

## 2020-07-08 ENCOUNTER — TELEPHONE (OUTPATIENT)
Dept: CARDIOLOGY | Facility: CLINIC | Age: 65
End: 2020-07-08

## 2020-07-08 DIAGNOSIS — E78.5 HYPERLIPIDEMIA, UNSPECIFIED HYPERLIPIDEMIA TYPE: ICD-10-CM

## 2020-07-08 LAB
ALBUMIN SERPL-MCNC: 3.9 G/DL (ref 3.5–5.2)
ALP SERPL-CCNC: 115 U/L (ref 39–117)
ALT SERPL W P-5'-P-CCNC: 13 U/L (ref 1–41)
AST SERPL-CCNC: 10 U/L (ref 1–40)
BILIRUB CONJ SERPL-MCNC: <0.2 MG/DL (ref 0–0.3)
BILIRUB INDIRECT SERPL-MCNC: NORMAL MG/DL
BILIRUB SERPL-MCNC: 0.3 MG/DL (ref 0–1.2)
CHOLEST SERPL-MCNC: 114 MG/DL (ref 0–200)
HDLC SERPL-MCNC: 32 MG/DL (ref 40–60)
LDLC SERPL CALC-MCNC: 64 MG/DL (ref 0–100)
LDLC/HDLC SERPL: 1.99 {RATIO}
PROT SERPL-MCNC: 7.1 G/DL (ref 6–8.5)
TRIGL SERPL-MCNC: 92 MG/DL (ref 0–150)
VLDLC SERPL-MCNC: 18.4 MG/DL (ref 5–40)

## 2020-07-08 PROCEDURE — 80061 LIPID PANEL: CPT

## 2020-07-08 PROCEDURE — 36415 COLL VENOUS BLD VENIPUNCTURE: CPT

## 2020-07-08 PROCEDURE — 80076 HEPATIC FUNCTION PANEL: CPT

## 2020-07-08 NOTE — TELEPHONE ENCOUNTER
----- Message from ANDRÉS Chung sent at 7/8/2020  9:56 AM EDT -----  Please let him know his labs are stable.  Continue the same regimen.

## 2020-07-08 NOTE — TELEPHONE ENCOUNTER
Advised pt of stable labs and that there will be no changes to his regimen at this time. Pt verbalized understanding.    JULIO CESAR Lock

## 2020-08-06 ENCOUNTER — HOSPITAL ENCOUNTER (OUTPATIENT)
Dept: CARDIOLOGY | Facility: HOSPITAL | Age: 65
Discharge: HOME | End: 2020-08-06
Admitting: INTERNAL MEDICINE

## 2020-08-06 VITALS
WEIGHT: 142 LBS | HEIGHT: 72 IN | HEART RATE: 72 BPM | BODY MASS INDEX: 19.23 KG/M2 | DIASTOLIC BLOOD PRESSURE: 83 MMHG | SYSTOLIC BLOOD PRESSURE: 149 MMHG

## 2020-08-06 DIAGNOSIS — I25.5 ISCHEMIC CARDIOMYOPATHY: ICD-10-CM

## 2020-08-06 LAB
AORTIC ARCH: 2.2 CM
BH CV ECHO MEAS - ACS: 2.2 CM
BH CV ECHO MEAS - AO MAX PG (FULL): 1.4 MMHG
BH CV ECHO MEAS - AO MAX PG: 4.8 MMHG
BH CV ECHO MEAS - AO MEAN PG (FULL): 0.91 MMHG
BH CV ECHO MEAS - AO MEAN PG: 2.7 MMHG
BH CV ECHO MEAS - AO V2 MAX: 109.8 CM/SEC
BH CV ECHO MEAS - AO V2 MEAN: 75.3 CM/SEC
BH CV ECHO MEAS - AO V2 VTI: 25.7 CM
BH CV ECHO MEAS - AVA(I,A): 3.1 CM^2
BH CV ECHO MEAS - AVA(I,D): 3.1 CM^2
BH CV ECHO MEAS - AVA(V,A): 2.8 CM^2
BH CV ECHO MEAS - AVA(V,D): 2.8 CM^2
BH CV ECHO MEAS - BSA(HAYCOCK): 1.8 M^2
BH CV ECHO MEAS - BSA: 1.8 M^2
BH CV ECHO MEAS - BZI_BMI: 19.3 KILOGRAMS/M^2
BH CV ECHO MEAS - BZI_METRIC_HEIGHT: 182.9 CM
BH CV ECHO MEAS - BZI_METRIC_WEIGHT: 64.4 KG
BH CV ECHO MEAS - EDV(MOD-SP2): 56 ML
BH CV ECHO MEAS - EDV(MOD-SP4): 49 ML
BH CV ECHO MEAS - EDV(TEICH): 90.7 ML
BH CV ECHO MEAS - EF(CUBED): 75 %
BH CV ECHO MEAS - EF(MOD-BP): 61.8 %
BH CV ECHO MEAS - EF(MOD-SP2): 60.7 %
BH CV ECHO MEAS - EF(MOD-SP4): 59.2 %
BH CV ECHO MEAS - EF(TEICH): 67.1 %
BH CV ECHO MEAS - ESV(MOD-SP2): 22 ML
BH CV ECHO MEAS - ESV(MOD-SP4): 20 ML
BH CV ECHO MEAS - ESV(TEICH): 29.8 ML
BH CV ECHO MEAS - FS: 37 %
BH CV ECHO MEAS - IVS/LVPW: 0.97
BH CV ECHO MEAS - IVSD: 0.94 CM
BH CV ECHO MEAS - LAT PEAK E' VEL: 10.2 CM/SEC
BH CV ECHO MEAS - LV DIASTOLIC VOL/BSA (35-75): 26.6 ML/M^2
BH CV ECHO MEAS - LV MASS(C)D: 140.9 GRAMS
BH CV ECHO MEAS - LV MASS(C)DI: 76.5 GRAMS/M^2
BH CV ECHO MEAS - LV MAX PG: 3.4 MMHG
BH CV ECHO MEAS - LV MEAN PG: 1.8 MMHG
BH CV ECHO MEAS - LV SYSTOLIC VOL/BSA (12-30): 10.9 ML/M^2
BH CV ECHO MEAS - LV V1 MAX: 92.4 CM/SEC
BH CV ECHO MEAS - LV V1 MEAN: 62.6 CM/SEC
BH CV ECHO MEAS - LV V1 VTI: 23.6 CM
BH CV ECHO MEAS - LVIDD: 4.5 CM
BH CV ECHO MEAS - LVIDS: 2.8 CM
BH CV ECHO MEAS - LVLD AP2: 7.7 CM
BH CV ECHO MEAS - LVLD AP4: 7.2 CM
BH CV ECHO MEAS - LVLS AP2: 6.3 CM
BH CV ECHO MEAS - LVLS AP4: 6.4 CM
BH CV ECHO MEAS - LVOT AREA (M): 3.5 CM^2
BH CV ECHO MEAS - LVOT AREA: 3.4 CM^2
BH CV ECHO MEAS - LVOT DIAM: 2.1 CM
BH CV ECHO MEAS - LVPWD: 0.96 CM
BH CV ECHO MEAS - MED PEAK E' VEL: 9.2 CM/SEC
BH CV ECHO MEAS - MV A DUR: 0.14 SEC
BH CV ECHO MEAS - MV A MAX VEL: 75.4 CM/SEC
BH CV ECHO MEAS - MV DEC SLOPE: 157.8 CM/SEC^2
BH CV ECHO MEAS - MV DEC TIME: 0.29 SEC
BH CV ECHO MEAS - MV E MAX VEL: 49.7 CM/SEC
BH CV ECHO MEAS - MV E/A: 0.66
BH CV ECHO MEAS - MV MAX PG: 2.9 MMHG
BH CV ECHO MEAS - MV MEAN PG: 0.91 MMHG
BH CV ECHO MEAS - MV P1/2T MAX VEL: 52.3 CM/SEC
BH CV ECHO MEAS - MV P1/2T: 97 MSEC
BH CV ECHO MEAS - MV V2 MAX: 84.7 CM/SEC
BH CV ECHO MEAS - MV V2 MEAN: 42.3 CM/SEC
BH CV ECHO MEAS - MV V2 VTI: 20.7 CM
BH CV ECHO MEAS - MVA P1/2T LCG: 4.2 CM^2
BH CV ECHO MEAS - MVA(P1/2T): 2.3 CM^2
BH CV ECHO MEAS - MVA(VTI): 3.8 CM^2
BH CV ECHO MEAS - PA ACC TIME: 0.15 SEC
BH CV ECHO MEAS - PA MAX PG (FULL): 1.6 MMHG
BH CV ECHO MEAS - PA MAX PG: 2.9 MMHG
BH CV ECHO MEAS - PA PR(ACCEL): 10.9 MMHG
BH CV ECHO MEAS - PA V2 MAX: 84.7 CM/SEC
BH CV ECHO MEAS - PULM A REVS DUR: 0.13 SEC
BH CV ECHO MEAS - PULM A REVS VEL: 26.6 CM/SEC
BH CV ECHO MEAS - PULM DIAS VEL: 35.6 CM/SEC
BH CV ECHO MEAS - PULM S/D: 1.4
BH CV ECHO MEAS - PULM SYS VEL: 49.3 CM/SEC
BH CV ECHO MEAS - PVA(V,A): 2.3 CM^2
BH CV ECHO MEAS - PVA(V,D): 2.3 CM^2
BH CV ECHO MEAS - QP/QS: 0.57
BH CV ECHO MEAS - RV MAX PG: 1.3 MMHG
BH CV ECHO MEAS - RV MEAN PG: 0.57 MMHG
BH CV ECHO MEAS - RV V1 MAX: 56.1 CM/SEC
BH CV ECHO MEAS - RV V1 MEAN: 34 CM/SEC
BH CV ECHO MEAS - RV V1 VTI: 13.3 CM
BH CV ECHO MEAS - RVOT AREA: 3.4 CM^2
BH CV ECHO MEAS - RVOT DIAM: 2.1 CM
BH CV ECHO MEAS - SI(CUBED): 36.2 ML/M^2
BH CV ECHO MEAS - SI(LVOT): 43.1 ML/M^2
BH CV ECHO MEAS - SI(MOD-SP2): 18.5 ML/M^2
BH CV ECHO MEAS - SI(MOD-SP4): 15.7 ML/M^2
BH CV ECHO MEAS - SI(TEICH): 33 ML/M^2
BH CV ECHO MEAS - SUP REN AO DIAM: 1.7 CM
BH CV ECHO MEAS - SV(CUBED): 66.7 ML
BH CV ECHO MEAS - SV(LVOT): 79.5 ML
BH CV ECHO MEAS - SV(MOD-SP2): 34 ML
BH CV ECHO MEAS - SV(MOD-SP4): 29 ML
BH CV ECHO MEAS - SV(RVOT): 45.4 ML
BH CV ECHO MEAS - SV(TEICH): 60.8 ML
BH CV ECHO MEAS - TAPSE (>1.6): 2.4 CM2
BH CV ECHO MEASUREMENTS AVERAGE E/E' RATIO: 5.12
BH CV XLRA - RV BASE: 3.5 CM
BH CV XLRA - RV LENGTH: 7.4 CM
BH CV XLRA - RV MID: 2.9 CM
BH CV XLRA - TDI S': 10.4 CM/SEC
LEFT ATRIUM VOLUME INDEX: 11 ML/M2
LV EF 2D ECHO EST: 62 %
SINUS: 3.6 CM
STJ: 3.2 CM

## 2020-08-06 PROCEDURE — 93306 TTE W/DOPPLER COMPLETE: CPT | Performed by: INTERNAL MEDICINE

## 2020-08-06 PROCEDURE — 93306 TTE W/DOPPLER COMPLETE: CPT

## 2020-08-13 ENCOUNTER — TELEPHONE (OUTPATIENT)
Dept: CARDIOLOGY | Facility: CLINIC | Age: 65
End: 2020-08-13

## 2020-08-13 NOTE — TELEPHONE ENCOUNTER
Patient wife called wanting to know echo results that was done on 08/06/20. Results are in epic.    They also want to know if you can write a letter for jury duty stating that he can not do jury duty due to his conditions.     She can be reached at [phone]     Thanks

## 2020-08-13 NOTE — TELEPHONE ENCOUNTER
"I called and spoke with patient and wife about echo results. She did have a few more questions. She wanted to know what does moderate calcification mean found on the echo? His echo also stated \"aortic valve abnormal\", they want to know if this something they need to worry about.   "

## 2020-08-14 NOTE — TELEPHONE ENCOUNTER
See below on questions about aortic valve on pt's echo and about getting out of jury duty.    Thanks,   Ro

## 2020-08-17 NOTE — TELEPHONE ENCOUNTER
See below. Pt's wife called today. I was not aware she needed by 3p today.  Would you mind addressing the note to keep him out of Jury Duty. She is worried about his COVID risk with his recent MI.  Please advise.    Thanks,  Ro

## 2020-08-18 ENCOUNTER — TELEPHONE (OUTPATIENT)
Dept: CARDIOLOGY | Facility: CLINIC | Age: 65
End: 2020-08-18

## 2020-08-18 NOTE — TELEPHONE ENCOUNTER
Returned patient's wife's call about echocardiogram results.  Discussed with her what the calcium on the aortic valve means and that this is just something to monitor in the future.  I also told her that it was reasonable to avoid crowded areas with him due to his recent cardiac status and the MI and if she needed a note for his jury duty from us to let us know and I be more than happy to write him 1.

## 2020-10-29 RX ORDER — ATORVASTATIN CALCIUM 40 MG/1
40 TABLET, FILM COATED ORAL DAILY
Qty: 90 TABLET | Refills: 4 | Status: SHIPPED | OUTPATIENT
Start: 2020-10-29 | End: 2022-01-14

## 2020-12-11 ENCOUNTER — TELEMEDICINE (OUTPATIENT)
Dept: CARDIOLOGY | Facility: CLINIC | Age: 65
End: 2020-12-11

## 2020-12-11 VITALS
HEART RATE: 65 BPM | SYSTOLIC BLOOD PRESSURE: 161 MMHG | HEIGHT: 72 IN | DIASTOLIC BLOOD PRESSURE: 97 MMHG | WEIGHT: 142.6 LBS | BODY MASS INDEX: 19.31 KG/M2

## 2020-12-11 DIAGNOSIS — E78.5 HYPERLIPIDEMIA, UNSPECIFIED HYPERLIPIDEMIA TYPE: ICD-10-CM

## 2020-12-11 DIAGNOSIS — F17.210 CIGARETTE SMOKER: ICD-10-CM

## 2020-12-11 DIAGNOSIS — I21.11 ACUTE ST ELEVATION MYOCARDIAL INFARCTION (STEMI) INVOLVING RIGHT CORONARY ARTERY (HCC): ICD-10-CM

## 2020-12-11 DIAGNOSIS — I10 ESSENTIAL HYPERTENSION: Primary | ICD-10-CM

## 2020-12-11 PROCEDURE — 99214 OFFICE O/P EST MOD 30 MIN: CPT | Performed by: NURSE PRACTITIONER

## 2020-12-11 PROCEDURE — 99406 BEHAV CHNG SMOKING 3-10 MIN: CPT | Performed by: NURSE PRACTITIONER

## 2020-12-11 NOTE — PROGRESS NOTES
Date of Office Visit: 2020  Encounter Provider: ANDRÉS Castellanos  Place of Service: [facility] CARDIOLOGY  Patient Name: Kieran Hdez  :1955    Chief Complaint   Patient presents with   • Cardiomyopathy   • Coronary Artery Disease   :     HPI: Kieran Hdez is a 65-year-old male who is a patient of Dr. Odonnell and is new to me today.  He has a history of hypertension, hyperlipidemia and tobacco abuse.  In April of this year he presented with an inferior posterior ST elevation MI and was taken for cardiac cath.  This revealed a normal left main, 30 to 40% proximal LAD, acutely occluded distal RCA, 80% mid RCA and small caliber PDA with discrete 95% distal stenosis.  He had a normal LV function with some inferior wall akinesis.  He had a successful angioplasty and drug-eluting stent placed to the mid to distal RCA with a 4.0 x 18 mm Xience Aisha drug-eluting stent.  Postprocedure his echo showed an EF of 40%.  He was started on Toprol and lisinopril.  His follow-up echo in August of this year showed improved LV function with an EF of 61% he had some moderate calcification of his aortic valve but no stenosis.    Overall he is doing pretty well.  His blood pressure is high today he has not really been checking it at home.  We have cut back his lisinopril to probably put him on metoprolol to avoid hypotension.  He is not having any angina or heart failure symptoms.  His wife is also on the call with him today and they are practicing social distancing.      Past Medical History:   Diagnosis Date   • CAD (coronary artery disease)    • Hyperlipidemia    • Hypertension    • Ischemic cardiomyopathy    • MI (myocardial infarction) (CMS/[facility]) 2020    inferior posterior lateral   • Tobacco abuse        Past Surgical History:   Procedure Laterality Date   • CARDIAC CATHETERIZATION N/A 4/3/2020    Procedure: Left Heart Cath;  Surgeon: Juan Odonnell MD;  Location: [facility]  INVASIVE LOCATION;  Service: Cardiovascular;  Laterality: N/A;   • CARDIAC CATHETERIZATION N/A 4/3/2020    Procedure: Stent KEVIN coronary;  Surgeon: Juan Odonnell MD;  Location:  [facility] CATH INVASIVE LOCATION;  Service: Cardiovascular;  Laterality: N/A;   • CARDIAC CATHETERIZATION N/A 4/3/2020    Procedure: Coronary angiography;  Surgeon: Juan Odonnell MD;  Location:  [facility] CATH INVASIVE LOCATION;  Service: Cardiovascular;  Laterality: N/A;   • CARDIAC CATHETERIZATION N/A 4/3/2020    Procedure: Left ventriculography;  Surgeon: Juan Odonnell MD;  Location:  [facility] CATH INVASIVE LOCATION;  Service: Cardiovascular;  Laterality: N/A;   • HERNIA REPAIR         Social History     Socioeconomic History   • Marital status:      Spouse name: Not on file   • Number of children: Not on file   • Years of education: Not on file   • Highest education level: Not on file   Tobacco Use   • Smoking status: Current Every Day Smoker     Packs/day: 0.25     Types: Cigarettes   • Smokeless tobacco: Never Used   • Tobacco comment: CAFFEINE USE: 4 CUPS COFFEE DAILY   Substance and Sexual Activity   • Alcohol use: No     Frequency: Never   • Drug use: No   • Sexual activity: Yes     Partners: Female       Family History   Problem Relation Age of Onset   • Emphysema Mother    • COPD Mother    • Heart attack Father    • COPD Brother    • Emphysema Brother        Review of Systems   Constitution: Negative for diaphoresis and malaise/fatigue.   Cardiovascular: Negative for chest pain, claudication, dyspnea on exertion, irregular heartbeat, leg swelling, near-syncope, orthopnea, palpitations, paroxysmal nocturnal dyspnea and syncope.   Respiratory: Negative for cough, shortness of breath and sleep disturbances due to breathing.    Musculoskeletal: Negative for falls.   Neurological: Negative for dizziness and weakness.   Psychiatric/Behavioral: Negative for altered mental status and substance abuse.       No Known  "Allergies      Current Outpatient Medications:   •  aspirin 81 MG chewable tablet, Chew 1 tablet Daily., Disp: 30 tablet, Rfl: 11  •  atorvastatin (LIPITOR) 40 MG tablet, Take 1 tablet by mouth Daily., Disp: 90 tablet, Rfl: 4  •  clopidogrel (PLAVIX) 75 MG tablet, Take 1 tablet by mouth Daily., Disp: 30 tablet, Rfl: 11  •  lisinopril (PRINIVIL,ZESTRIL) 10 MG tablet, Take 1 tablet by mouth Daily., Disp: 90 tablet, Rfl: 3  •  metoprolol succinate XL (TOPROL-XL) 25 MG 24 hr tablet, Take 1 tablet by mouth Daily., Disp: 30 tablet, Rfl: 11  •  senna-docusate sodium (SENOKOT-S) 8.6-50 MG tablet, Take 2 tablets by mouth Daily., Disp: 60 tablet, Rfl: 5  •  Wheat Dextrin (BENEFIBER DRINK MIX PO), Take  by mouth As Needed., Disp: , Rfl:   •  DOCUSATE CALCIUM PO, Take  by mouth As Needed., Disp: , Rfl:       Objective:     Vitals:    12/11/20 1240   BP: 161/97   Pulse: 65   Weight: 64.7 kg (142 lb 9.6 oz)   Height: 182.9 cm (72\")     Body mass index is 19.34 kg/m².    PHYSICAL EXAM:    Constitutional:       Appearance: Well-developed.   HENT:      Head: Normocephalic.   Neck:      Musculoskeletal: Normal range of motion.   Neurological:      Mental Status: Alert and oriented to person, place, and time.         Procedures      Assessment:       Diagnosis Plan   1. Essential hypertension     2. Hyperlipidemia, unspecified hyperlipidemia type     3. Acute ST elevation myocardial infarction (STEMI) involving right coronary artery (CMS/HCC)     4. Cigarette smoker       No orders of the defined types were placed in this encounter.         Plan:       He is getting check his blood pressure over the next 2 weeks and send me a message and let me know how it is doing.  If it remains high working to start him on lisinopril 20 mg instead of 10.  He remains on Plavix his year will be in April 2021.  We also talked about smoking and he has tried Chantix before but it was difficult due to the cost of the medication.  He is getting try " cutting back 1 cigarette a day a week and see if he can wean off that way.      Kieran Hdez  reports that he has been smoking cigarettes. He has been smoking about 0.25 packs per day. He has never used smokeless tobacco.. I have educated him on the risk of diseases from using tobacco products such as cancer, COPD and heart disease.     I advised him to quit and he is willing to quit. We have discussed the following method/s for tobacco cessation:  Counseling.  Together we have set a quit date for 6 months.  He will follow up with me in 5 months or sooner to check on his progress.    I spent 5 minutes counseling the patient.    This patient has consented to a telehealth visit via video. The visit was scheduled as a video visit to comply with patient safety concerns in accordance with CDC recommendations.  All vitals recorded within this visit are reported by the patient.  I spent 25 minutes in total including but not limited to the 22 minutes spent in direct conversation with this patient.        He will follow back up with us in 6 months.       Your medication list          Accurate as of December 11, 2020  1:12 PM. If you have any questions, ask your nurse or doctor.            CONTINUE taking these medications      Instructions Last Dose Given Next Dose Due   aspirin 81 MG chewable tablet      Chew 1 tablet Daily.       atorvastatin 40 MG tablet  Commonly known as: LIPITOR      Take 1 tablet by mouth Daily.       BENEFIBER DRINK MIX PO      Take  by mouth As Needed.       clopidogrel 75 MG tablet  Commonly known as: PLAVIX      Take 1 tablet by mouth Daily.       DOCUSATE CALCIUM PO      Take  by mouth As Needed.       lisinopril 10 MG tablet  Commonly known as: PRINIVIL,ZESTRIL      Take 1 tablet by mouth Daily.       metoprolol succinate XL 25 MG 24 hr tablet  Commonly known as: TOPROL-XL      Take 1 tablet by mouth Daily.       sennosides-docusate 8.6-50 MG per tablet  Commonly known as: PERICOLACE      Take 2  tablets by mouth Daily.                As always, it has been a pleasure to participate in your patient's care.      Sincerely,     Cinthya BOWEN

## 2020-12-29 ENCOUNTER — TELEPHONE (OUTPATIENT)
Dept: CARDIOLOGY | Facility: CLINIC | Age: 65
End: 2020-12-29

## 2020-12-29 RX ORDER — LISINOPRIL 20 MG/1
20 TABLET ORAL DAILY
Qty: 90 TABLET | Refills: 3 | Status: SHIPPED | OUTPATIENT
Start: 2020-12-29 | End: 2021-07-14 | Stop reason: SDUPTHER

## 2020-12-29 NOTE — TELEPHONE ENCOUNTER
I spoke with him.  I am going to increase his lisinopril to 20 mg daily.  I would like to see his diastolic less than 80.  Regarding his heart rate, I am not concerned.  He denies any symptoms of dizziness, lightheadedness, or syncope.

## 2020-12-29 NOTE — TELEPHONE ENCOUNTER
----- Message from Hayde Larson MA sent at 12/28/2020  5:46 PM EST -----  Regarding: FW: Visit Follow-Up Question  Contact: [phone]  Please advise.  ----- Message -----  From: Kieran Hdez  Sent: 12/28/2020  12:44 PM EST  To: [facility]  Subject: Visit Follow-Up Question                         Hello, sending my husbands BP tracking   Sun-13   118/72 (60)                  Tues-22  157/90 (70)  Mon-14   135/75 (62)                  Wed-23  138/92 (62)  Tues-15   146/90 (61)                  Thurs-24  140/84 (56)  Wed-16    119/79 (53)                  Fri-25  146/73  (61)  Thurs-17    121/77 (63)               Sat-20  138/95 (57)  Fri-18 -   143/96 (62)                    Sun-27  140/89  (60)         His heartrate seems low... is this an issue?    thanks  Sat-19   136/95 (54)  Mon-21  126/79 (60)

## 2021-01-26 RX ORDER — METOPROLOL SUCCINATE 25 MG/1
25 TABLET, EXTENDED RELEASE ORAL DAILY
Qty: 90 TABLET | Refills: 4 | Status: SHIPPED | OUTPATIENT
Start: 2021-01-26 | End: 2022-02-21

## 2021-03-19 ENCOUNTER — BULK ORDERING (OUTPATIENT)
Dept: CASE MANAGEMENT | Facility: OTHER | Age: 66
End: 2021-03-19

## 2021-03-19 DIAGNOSIS — Z23 IMMUNIZATION DUE: ICD-10-CM

## 2021-04-14 RX ORDER — CLOPIDOGREL BISULFATE 75 MG/1
75 TABLET ORAL DAILY
Qty: 30 TABLET | Refills: 11 | Status: SHIPPED | OUTPATIENT
Start: 2021-04-14 | End: 2022-05-05

## 2021-04-26 RX ORDER — LISINOPRIL 20 MG/1
20 TABLET ORAL DAILY
Qty: 90 TABLET | Refills: 4 | Status: SHIPPED | OUTPATIENT
Start: 2021-04-26 | End: 2022-05-16

## 2021-07-14 ENCOUNTER — OFFICE VISIT (OUTPATIENT)
Dept: CARDIOLOGY | Facility: CLINIC | Age: 66
End: 2021-07-14

## 2021-07-14 VITALS
HEART RATE: 68 BPM | DIASTOLIC BLOOD PRESSURE: 88 MMHG | SYSTOLIC BLOOD PRESSURE: 140 MMHG | WEIGHT: 132 LBS | BODY MASS INDEX: 17.88 KG/M2 | HEIGHT: 72 IN

## 2021-07-14 DIAGNOSIS — I10 ESSENTIAL HYPERTENSION: Primary | ICD-10-CM

## 2021-07-14 DIAGNOSIS — I25.10 CORONARY ARTERY DISEASE INVOLVING NATIVE CORONARY ARTERY OF NATIVE HEART WITHOUT ANGINA PECTORIS: ICD-10-CM

## 2021-07-14 DIAGNOSIS — E78.5 HYPERLIPIDEMIA, UNSPECIFIED HYPERLIPIDEMIA TYPE: ICD-10-CM

## 2021-07-14 PROCEDURE — 93000 ELECTROCARDIOGRAM COMPLETE: CPT | Performed by: INTERNAL MEDICINE

## 2021-07-14 PROCEDURE — 99214 OFFICE O/P EST MOD 30 MIN: CPT | Performed by: INTERNAL MEDICINE

## 2021-07-14 NOTE — PROGRESS NOTES
Date of Office Visit: 21  Encounter Provider: Juan Odonnell MD  Place of Service: [facility] CARDIOLOGY  Patient Name: Kieran Hdez  :1955    Chief Complaint   Patient presents with   • Follow-up     6 month   • Coronary Artery Disease   :     HPI: 66-year-old male with a history of hypertension, hyperlipidemia and tobacco abuse.  In 2020 he presented with an inferior posterior ST elevation MI and was taken for cardiac cath.  This revealed a normal left main, 30 to 40% proximal LAD, acutely occluded distal RCA, 80% mid RCA and small caliber PDA with discrete 95% distal stenosis.  He had a normal LV function with some inferior wall akinesis.  He had a successful angioplasty and drug-eluting stent placed to the mid to distal RCA with a 4.0 x 18 mm Xience Aisha drug-eluting stent.  Postprocedure his echo showed an EF of 40%.  He was started on Toprol and lisinopril.  His follow-up echo in 2020 showed improved LV function with an EF of 61% he had some moderate calcification of his aortic valve but no stenosis.    Since her last visit he has been doing well.  He states he has been taking his medication as prescribed and other than mild bruising he has been tolerating this.  He reports that his blood pressure has been better controlled at home.  He denies any myalgias on his statin.  He has no chest pain.  He continues to complain of IVERSON but also continues to smoke.  His dyspnea is no worse than it has been prior.    Past Medical History:   Diagnosis Date   • CAD (coronary artery disease)    • Hyperlipidemia    • Hypertension    • Ischemic cardiomyopathy    • MI (myocardial infarction) (CMS/[facility]) 2020    inferior posterior lateral   • Tobacco abuse        Past Surgical History:   Procedure Laterality Date   • CARDIAC CATHETERIZATION N/A 4/3/2020    Procedure: Left Heart Cath;  Surgeon: Juan Odonnell MD;  Location: [facility] CATH INVASIVE LOCATION;   Service: Cardiovascular;  Laterality: N/A;   • CARDIAC CATHETERIZATION N/A 4/3/2020    Procedure: Stent KEVIN coronary;  Surgeon: Juan Odonnell MD;  Location: [facility] CATH INVASIVE LOCATION;  Service: Cardiovascular;  Laterality: N/A;   • CARDIAC CATHETERIZATION N/A 4/3/2020    Procedure: Coronary angiography;  Surgeon: Juan Odonnell MD;  Location:  [facility] CATH INVASIVE LOCATION;  Service: Cardiovascular;  Laterality: N/A;   • CARDIAC CATHETERIZATION N/A 4/3/2020    Procedure: Left ventriculography;  Surgeon: Juan Odonnell MD;  Location:  [facility] CATH INVASIVE LOCATION;  Service: Cardiovascular;  Laterality: N/A;   • HERNIA REPAIR         Social History     Socioeconomic History   • Marital status:      Spouse name: Not on file   • Number of children: Not on file   • Years of education: Not on file   • Highest education level: Not on file   Tobacco Use   • Smoking status: Current Every Day Smoker     Packs/day: 0.25     Types: Cigarettes   • Smokeless tobacco: Never Used   • Tobacco comment: CAFFEINE USE: 4 CUPS COFFEE DAILY   Substance and Sexual Activity   • Alcohol use: No   • Drug use: No   • Sexual activity: Yes     Partners: Female       Family History   Problem Relation Age of Onset   • Emphysema Mother    • COPD Mother    • Heart attack Father    • COPD Brother    • Emphysema Brother        Review of Systems   Constitutional: Negative for diaphoresis and malaise/fatigue.   Cardiovascular: Negative for chest pain, claudication, dyspnea on exertion, irregular heartbeat, leg swelling, near-syncope, orthopnea, palpitations, paroxysmal nocturnal dyspnea and syncope.   Respiratory: Negative for cough, shortness of breath and sleep disturbances due to breathing.    Musculoskeletal: Negative for falls.   Neurological: Negative for dizziness and weakness.   Psychiatric/Behavioral: Negative for altered mental status and substance abuse.       No Known Allergies      Current Outpatient  "Medications:   •  aspirin 81 MG chewable tablet, Chew 1 tablet Daily., Disp: 30 tablet, Rfl: 11  •  atorvastatin (LIPITOR) 40 MG tablet, Take 1 tablet by mouth Daily., Disp: 90 tablet, Rfl: 4  •  clopidogrel (PLAVIX) 75 MG tablet, Take 1 tablet by mouth Daily., Disp: 30 tablet, Rfl: 11  •  DOCUSATE CALCIUM PO, Take  by mouth As Needed., Disp: , Rfl:   •  lisinopril (PRINIVIL,ZESTRIL) 20 MG tablet, Take 1 tablet by mouth Daily., Disp: 90 tablet, Rfl: 4  •  metoprolol succinate XL (TOPROL-XL) 25 MG 24 hr tablet, Take 1 tablet by mouth Daily., Disp: 90 tablet, Rfl: 4  •  senna-docusate sodium (SENOKOT-S) 8.6-50 MG tablet, Take 2 tablets by mouth Daily., Disp: 60 tablet, Rfl: 5  •  Wheat Dextrin (BENEFIBER DRINK MIX PO), Take  by mouth As Needed., Disp: , Rfl:       Objective:     Vitals:    07/14/21 1343   BP: 140/88   Pulse: 68   Weight: 59.9 kg (132 lb)   Height: 182.9 cm (72\")     Body mass index is 17.9 kg/m².    PHYSICAL EXAM:    Constitutional:       Appearance: Well-developed.   Eyes:      General: No scleral icterus.     Conjunctiva/sclera: Conjunctivae normal.   HENT:      Head: Normocephalic and atraumatic.   Neck:      Thyroid: No thyromegaly.      Vascular: Normal carotid pulses. No carotid bruit, hepatojugular reflux or JVD.      Trachea: No tracheal deviation.   Pulmonary:      Effort: No respiratory distress.      Breath sounds: Normal breath sounds. No decreased breath sounds. No wheezing. No rhonchi. No rales.   Chest:      Chest wall: Not tender to palpatation.   Cardiovascular:      Normal rate. Regular rhythm.      No gallop.   Pulses:     Carotid: 2+ bilaterally.     Radial: 2+ bilaterally.     Femoral: 2+ bilaterally.     Dorsalis pedis: 2+ bilaterally.     Posterior tibial: 2+ bilaterally.  Edema:     Peripheral edema absent.   Abdominal:      General: Bowel sounds are normal. There is no distension.      Palpations: Abdomen is soft.      Tenderness: There is no abdominal tenderness. "   Musculoskeletal:         General: No deformity.      Cervical back: Normal range of motion and neck supple. Skin:     Findings: No erythema or rash.   Neurological:      Mental Status: Alert and oriented to person, place, and time.      Sensory: No sensory deficit.   Psychiatric:         Behavior: Behavior normal.           ECG 12 Lead    Date/Time: 7/14/2021 2:01 PM  Performed by: Juan Odonnell MD  Authorized by: Juan Odonnell MD   Comparison: compared with previous ECG from 4/3/2020  Comparison to previous ECG: Inferior T wave abnormalities have resolved.  Rhythm: sinus rhythm  Rate: normal  Other findings: left atrial abnormality                 8/6/2020  · Left ventricular systolic function is normal. Calculated EF = 61.8%. Estimated EF was in agreement with the calculated EF. Estimated EF = 62%. Normal left ventricular cavity size and wall thickness noted. All left ventricular wall segments contract normally. Left ventricular diastolic dysfunction is noted (grade I) consistent with impaired relaxation.  · There is moderate calcification of the aortic valve.No aortic valve regurgitation is present. No aortic valve stenosis is present.      4/3/2020  Conclusions:   1. Left main: Normal  2. LAD: 30 to 40% proximal stenosis  3. LCX: Small caliber.  Normal.  4. RCA: Acutely occluded distal segment.  80% mid vessel stenosis.  Small caliber PDA with discrete 95% distal stenosis.  5.  Normal left ventricular size and systolic function.  Inferior wall akinesis  6.  Successful PCI of the mid and distal RCA with 4.0 x 18 mm Xience Aisha drug-eluting stent in both segments.  Postdilated with a 4.5 mm noncompliant trek balloon      Assessment:      No diagnosis found.  No orders of the defined types were placed in this encounter.    Plan:   Very pleasant 66-year-old male with a medical history of prior inferoposterior ST elevation MI, PCI to the mid and distal RCA as above, hyperlipidemia, hypertension,  tobacco abuse who presents back in follow-up.  He continues to smoke and has no interest in quitting.  He is taking his medication but did quit taking his aspirin secondary to bleeding from a hemorrhoid.  He denies any chest pain or change in his baseline dyspnea    1.  Coronary disease with prior inferoposterior ST elevation MI  -EKG has normalized.  -Ejection fraction and normal  -Continue Plavix lifelong.  Does not want to take aspirin.  -Continue atorvastatin to 40 mg p.o. nightly.  CMP and lipid panel have been ordered.  Have given him a prescription.  -Continue metoprolol succinate and lisinopril therapy at current dose.  He is tolerating his medications well.    2.  Essential hypertension: Reasonably controlled.  Continue metoprolol and lisinopril therapy.  CMP has been ordered.  No resting bradycardia or fatigue reported with metoprolol therapy.    3.  Hyperlipidemia: Mixed continue atorvastatin 40 mg p.o. nightly.  CMP and lipid panel ordered.  No myalgias.    I will see him back in 1 year         Your medication list          Accurate as of July 14, 2021  1:59 PM. If you have any questions, ask your nurse or doctor.            CONTINUE taking these medications      Instructions Last Dose Given Next Dose Due   aspirin 81 MG chewable tablet      Chew 1 tablet Daily.       atorvastatin 40 MG tablet  Commonly known as: LIPITOR      Take 1 tablet by mouth Daily.       BENEFIBER DRINK MIX PO      Take  by mouth As Needed.       clopidogrel 75 MG tablet  Commonly known as: PLAVIX      Take 1 tablet by mouth Daily.       DOCUSATE CALCIUM PO      Take  by mouth As Needed.       lisinopril 20 MG tablet  Commonly known as: PRINIVIL,ZESTRIL      Take 1 tablet by mouth Daily.       metoprolol succinate XL 25 MG 24 hr tablet  Commonly known as: TOPROL-XL      Take 1 tablet by mouth Daily.       sennosides-docusate 8.6-50 MG per tablet  Commonly known as: PERICOLACE      Take 2 tablets by mouth Daily.

## 2021-10-22 LAB
ALBUMIN SERPL-MCNC: 4.2 G/DL (ref 3.8–4.8)
ALBUMIN/GLOB SERPL: 1.6 {RATIO} (ref 1.2–2.2)
ALP SERPL-CCNC: 128 IU/L (ref 44–121)
ALT SERPL-CCNC: 9 IU/L (ref 0–44)
AMBIG ABBREV CMP14 DEFAULT: NORMAL
AMBIG ABBREV LP DEFAULT: NORMAL
AST SERPL-CCNC: 13 IU/L (ref 0–40)
BASOPHILS # BLD AUTO: 0 X10E3/UL (ref 0–0.2)
BASOPHILS NFR BLD AUTO: 0 %
BILIRUB SERPL-MCNC: 0.3 MG/DL (ref 0–1.2)
BUN SERPL-MCNC: 14 MG/DL (ref 8–27)
BUN/CREAT SERPL: 12 (ref 10–24)
CALCIUM SERPL-MCNC: 9.2 MG/DL (ref 8.6–10.2)
CHLORIDE SERPL-SCNC: 101 MMOL/L (ref 96–106)
CHOLEST SERPL-MCNC: 122 MG/DL (ref 100–199)
CO2 SERPL-SCNC: 22 MMOL/L (ref 20–29)
CREAT SERPL-MCNC: 1.14 MG/DL (ref 0.76–1.27)
EOSINOPHIL # BLD AUTO: 0.1 X10E3/UL (ref 0–0.4)
EOSINOPHIL NFR BLD AUTO: 1 %
ERYTHROCYTE [DISTWIDTH] IN BLOOD BY AUTOMATED COUNT: 13.8 % (ref 11.6–15.4)
GLOBULIN SER CALC-MCNC: 2.6 G/DL (ref 1.5–4.5)
GLUCOSE SERPL-MCNC: 87 MG/DL (ref 65–99)
HCT VFR BLD AUTO: 49.2 % (ref 37.5–51)
HDLC SERPL-MCNC: 32 MG/DL
HGB BLD-MCNC: 16.4 G/DL (ref 13–17.7)
IMM GRANULOCYTES # BLD AUTO: 0 X10E3/UL (ref 0–0.1)
IMM GRANULOCYTES NFR BLD AUTO: 0 %
LDLC SERPL CALC-MCNC: 69 MG/DL (ref 0–99)
LYMPHOCYTES # BLD AUTO: 1.3 X10E3/UL (ref 0.7–3.1)
LYMPHOCYTES NFR BLD AUTO: 20 %
MCH RBC QN AUTO: 29.2 PG (ref 26.6–33)
MCHC RBC AUTO-ENTMCNC: 33.3 G/DL (ref 31.5–35.7)
MCV RBC AUTO: 88 FL (ref 79–97)
MONOCYTES # BLD AUTO: 0.6 X10E3/UL (ref 0.1–0.9)
MONOCYTES NFR BLD AUTO: 9 %
MORPHOLOGY BLD-IMP: NORMAL
NEUTROPHILS # BLD AUTO: 4.7 X10E3/UL (ref 1.4–7)
NEUTROPHILS NFR BLD AUTO: 70 %
PLATELET # BLD AUTO: NORMAL X10E3/UL
POTASSIUM SERPL-SCNC: 4.5 MMOL/L (ref 3.5–5.2)
PROT SERPL-MCNC: 6.8 G/DL (ref 6–8.5)
RBC # BLD AUTO: 5.61 X10E6/UL (ref 4.14–5.8)
SODIUM SERPL-SCNC: 138 MMOL/L (ref 134–144)
TRIGL SERPL-MCNC: 115 MG/DL (ref 0–149)
VLDLC SERPL CALC-MCNC: 21 MG/DL (ref 5–40)
WBC # BLD AUTO: 6.7 X10E3/UL (ref 3.4–10.8)

## 2022-01-14 RX ORDER — ATORVASTATIN CALCIUM 40 MG/1
TABLET, FILM COATED ORAL
Qty: 90 TABLET | Refills: 4 | Status: SHIPPED | OUTPATIENT
Start: 2022-01-14 | End: 2022-10-24 | Stop reason: SDUPTHER

## 2022-02-21 RX ORDER — METOPROLOL SUCCINATE 25 MG/1
TABLET, EXTENDED RELEASE ORAL
Qty: 90 TABLET | Refills: 4 | Status: SHIPPED | OUTPATIENT
Start: 2022-02-21 | End: 2023-03-28

## 2022-02-21 NOTE — TELEPHONE ENCOUNTER
Protocol Error  At 7/29/21 appt with Dr. Odonnell:   2.  Essential hypertension: Reasonably controlled.

## 2022-05-05 RX ORDER — CLOPIDOGREL BISULFATE 75 MG/1
TABLET ORAL
Qty: 90 TABLET | Refills: 1 | Status: SHIPPED | OUTPATIENT
Start: 2022-05-05 | End: 2022-11-04

## 2022-05-16 RX ORDER — LISINOPRIL 20 MG/1
TABLET ORAL
Qty: 90 TABLET | Refills: 4 | Status: SHIPPED | OUTPATIENT
Start: 2022-05-16 | End: 2022-07-22

## 2022-07-22 ENCOUNTER — OFFICE VISIT (OUTPATIENT)
Dept: CARDIOLOGY | Facility: CLINIC | Age: 67
End: 2022-07-22

## 2022-07-22 VITALS
WEIGHT: 129 LBS | HEART RATE: 85 BPM | BODY MASS INDEX: 17.47 KG/M2 | DIASTOLIC BLOOD PRESSURE: 92 MMHG | SYSTOLIC BLOOD PRESSURE: 151 MMHG | HEIGHT: 72 IN | OXYGEN SATURATION: 98 %

## 2022-07-22 DIAGNOSIS — I10 PRIMARY HYPERTENSION: ICD-10-CM

## 2022-07-22 DIAGNOSIS — I25.10 CORONARY ARTERY DISEASE INVOLVING NATIVE CORONARY ARTERY OF NATIVE HEART WITHOUT ANGINA PECTORIS: Primary | ICD-10-CM

## 2022-07-22 DIAGNOSIS — I21.11 ACUTE ST ELEVATION MYOCARDIAL INFARCTION (STEMI) INVOLVING RIGHT CORONARY ARTERY: ICD-10-CM

## 2022-07-22 DIAGNOSIS — E78.5 HYPERLIPIDEMIA, UNSPECIFIED HYPERLIPIDEMIA TYPE: ICD-10-CM

## 2022-07-22 DIAGNOSIS — F17.210 CIGARETTE SMOKER: ICD-10-CM

## 2022-07-22 PROBLEM — I25.2 HISTORY OF ACUTE ANTERIOR WALL MI: Status: ACTIVE | Noted: 2022-07-22

## 2022-07-22 PROCEDURE — 93000 ELECTROCARDIOGRAM COMPLETE: CPT | Performed by: NURSE PRACTITIONER

## 2022-07-22 PROCEDURE — 99214 OFFICE O/P EST MOD 30 MIN: CPT | Performed by: NURSE PRACTITIONER

## 2022-07-22 RX ORDER — LISINOPRIL 20 MG/1
20 TABLET ORAL 2 TIMES DAILY
Qty: 180 TABLET | Refills: 3 | Status: SHIPPED | OUTPATIENT
Start: 2022-07-22

## 2022-07-22 NOTE — PROGRESS NOTES
Date of Office Visit: 2022  Encounter Provider: ANDRÉS Castellanos  Place of Service: [facility] CARDIOLOGY  Patient Name: Kieran Hdez  :1955    Chief Complaint   Patient presents with   • Coronary Artery Disease   • Hypertension   :     HPI: Kieran Hdez is a 67 y.o. male who is a patient of Dr. Odonnell and is known to me from previous.  He has a history of coronary disease and had PCI to the mid and distal RCA in 2020 after an ST elevation MI.  He also has hyperlipidemia and hypertension as well as continued tobacco abuse.  He used to smoke a lot more but is cut down to three quarters of a pack a day.  He was in the office a year ago and was doing well.  His last cholesterol was in October and it was at goal.  Previous testing and notes have been reviewed by me.   Past Medical History:   Diagnosis Date   • CAD (coronary artery disease)    • Hyperlipidemia    • Hypertension    • Ischemic cardiomyopathy    • MI (myocardial infarction) ([facility]) 2020    inferior posterior lateral   • Tobacco abuse        Past Surgical History:   Procedure Laterality Date   • CARDIAC CATHETERIZATION N/A 4/3/2020    Procedure: Left Heart Cath;  Surgeon: Juan Odonnell MD;  Location: [facility] INVASIVE LOCATION;  Service: Cardiovascular;  Laterality: N/A;   • CARDIAC CATHETERIZATION N/A 4/3/2020    Procedure: Stent KEVIN coronary;  Surgeon: Juan Odonnell MD;  Location: [facility] CATH INVASIVE LOCATION;  Service: Cardiovascular;  Laterality: N/A;   • CARDIAC CATHETERIZATION N/A 4/3/2020    Procedure: Coronary angiography;  Surgeon: Juan Odonnell MD;  Location: [facility] CATH INVASIVE LOCATION;  Service: Cardiovascular;  Laterality: N/A;   • CARDIAC CATHETERIZATION N/A 4/3/2020    Procedure: Left ventriculography;  Surgeon: Juan Odonnell MD;  Location: [facility] CATH INVASIVE LOCATION;  Service: Cardiovascular;  Laterality: N/A;   • HERNIA REPAIR         Social  History     Socioeconomic History   • Marital status:    Tobacco Use   • Smoking status: Current Every Day Smoker     Packs/day: 0.25     Types: Cigarettes   • Smokeless tobacco: Never Used   • Tobacco comment: CAFFEINE USE: 4 CUPS COFFEE DAILY   Substance and Sexual Activity   • Alcohol use: No   • Drug use: No   • Sexual activity: Yes     Partners: Female       Family History   Problem Relation Age of Onset   • Emphysema Mother    • COPD Mother    • Heart attack Father    • COPD Brother    • Emphysema Brother        Review of Systems   Constitutional: Negative for diaphoresis and malaise/fatigue.   Cardiovascular: Positive for leg swelling. Negative for chest pain, claudication, dyspnea on exertion, irregular heartbeat, near-syncope, orthopnea, palpitations, paroxysmal nocturnal dyspnea and syncope.   Respiratory: Negative for cough, shortness of breath and sleep disturbances due to breathing.    Musculoskeletal: Negative for falls.   Neurological: Negative for dizziness and weakness.   Psychiatric/Behavioral: Negative for altered mental status and substance abuse.       No Known Allergies      Current Outpatient Medications:   •  atorvastatin (LIPITOR) 40 MG tablet, TAKE 1 TABLET BY MOUTH EVERY DAY, Disp: 90 tablet, Rfl: 4  •  clopidogrel (PLAVIX) 75 MG tablet, TAKE 1 TABLET BY MOUTH EVERY DAY, Disp: 90 tablet, Rfl: 1  •  DOCUSATE CALCIUM PO, Take  by mouth As Needed., Disp: , Rfl:   •  lisinopril (PRINIVIL,ZESTRIL) 20 MG tablet, Take 1 tablet by mouth 2 (Two) Times a Day., Disp: 180 tablet, Rfl: 3  •  metoprolol succinate XL (TOPROL-XL) 25 MG 24 hr tablet, TAKE 1 TABLET BY MOUTH EVERY DAY, Disp: 90 tablet, Rfl: 4  •  senna-docusate sodium (SENOKOT-S) 8.6-50 MG tablet, Take 2 tablets by mouth Daily., Disp: 60 tablet, Rfl: 5  •  Wheat Dextrin (BENEFIBER DRINK MIX PO), Take  by mouth As Needed., Disp: , Rfl:       Objective:     Vitals:    07/22/22 1310   BP: 151/92   BP Location: Right arm   Patient  "Position: Sitting   Cuff Size: Adult   Pulse: 85   SpO2: 98%   Weight: 58.5 kg (129 lb)   Height: 182.9 cm (72\")     Body mass index is 17.5 kg/m².    PHYSICAL EXAM:    Constitutional:       General: Not in acute distress.     Appearance: Normal appearance. Well-developed.   Eyes:      Pupils: Pupils are equal, round, and reactive to light.   HENT:      Head: Normocephalic.   Neck:      Vascular: No carotid bruit or JVD.   Pulmonary:      Effort: Pulmonary effort is normal. No tachypnea.      Breath sounds: Normal breath sounds. No wheezing. No rales.   Cardiovascular:      Normal rate. Regular rhythm.      No gallop.   Pulses:     Intact distal pulses.   Edema:     Pretibial: trace edema of the right pretibial area.  Abdominal:      General: Bowel sounds are normal.      Palpations: Abdomen is soft.      Tenderness: There is no abdominal tenderness.   Musculoskeletal: Normal range of motion.      Cervical back: Normal range of motion and neck supple. No edema. Skin:     General: Skin is warm and dry.   Neurological:      Mental Status: Alert and oriented to person, place, and time.           ECG 12 Lead    Date/Time: 7/22/2022 1:37 PM  Performed by: Cinthya Shelley APRN  Authorized by: Cinthya Shelley APRN   Rhythm: sinus rhythm  Rate: normal  QRS axis: right    Clinical impression: non-specific ECG              Assessment:       Diagnosis Plan   1. Coronary artery disease involving native coronary artery of native heart without angina pectoris     2. Hyperlipidemia, unspecified hyperlipidemia type     3. Cigarette smoker     4. Primary hypertension     5. Acute ST elevation myocardial infarction (STEMI) involving right coronary artery (HCC)       No orders of the defined types were placed in this encounter.         Plan:       He is not ready to quit smoking he has cut back from what he was doing before.  He denies any chest pain pressure tightness.  Occasionally he has a little bit of swelling in his legs " but for the most part is doing well.  He lives a pretty sedentary lifestyle does not do any regular exercise.  He is compliant with his medications.  His blood pressure is a little high it was not a last visit to some and increase his lisinopril to 20 mg twice a day.  We will have him follow-up with us in 3 months.         Your medication list          Accurate as of July 22, 2022  1:35 PM. If you have any questions, ask your nurse or doctor.            CHANGE how you take these medications      Instructions Last Dose Given Next Dose Due   lisinopril 20 MG tablet  Commonly known as: PRINIVILZESTRIL  What changed: when to take this  Changed by: ANDRÉS Castellanos      Take 1 tablet by mouth 2 (Two) Times a Day.          CONTINUE taking these medications      Instructions Last Dose Given Next Dose Due   atorvastatin 40 MG tablet  Commonly known as: LIPITOR      TAKE 1 TABLET BY MOUTH EVERY DAY       BENEFIBER DRINK MIX PO      Take  by mouth As Needed.       clopidogrel 75 MG tablet  Commonly known as: PLAVIX      TAKE 1 TABLET BY MOUTH EVERY DAY       DOCUSATE CALCIUM PO      Take  by mouth As Needed.       metoprolol succinate XL 25 MG 24 hr tablet  Commonly known as: TOPROL-XL      TAKE 1 TABLET BY MOUTH EVERY DAY       sennosides-docusate 8.6-50 MG per tablet  Commonly known as: PERICOLACE      Take 2 tablets by mouth Daily.          STOP taking these medications    aspirin 81 MG chewable tablet  Stopped by: ANDRÉS Castellanos              Where to Get Your Medications      These medications were sent to [id number] - [place], [place] - [address]  AT [facility] - [phone]  - [phone] FX [address] [place] [address]    Phone: [phone]   · lisinopril 20 MG tablet           As always, it has been a pleasure to participate in your patient's care.      Sincerely,     Cinthya BOWEN

## 2022-10-24 ENCOUNTER — LAB (OUTPATIENT)
Dept: LAB | Facility: HOSPITAL | Age: 67
End: 2022-10-24

## 2022-10-24 ENCOUNTER — OFFICE VISIT (OUTPATIENT)
Dept: CARDIOLOGY | Facility: CLINIC | Age: 67
End: 2022-10-24

## 2022-10-24 VITALS
WEIGHT: 132 LBS | HEART RATE: 60 BPM | BODY MASS INDEX: 17.88 KG/M2 | OXYGEN SATURATION: 99 % | DIASTOLIC BLOOD PRESSURE: 80 MMHG | HEIGHT: 72 IN | SYSTOLIC BLOOD PRESSURE: 129 MMHG

## 2022-10-24 DIAGNOSIS — R73.01 IMPAIRED FASTING GLUCOSE: ICD-10-CM

## 2022-10-24 DIAGNOSIS — I10 PRIMARY HYPERTENSION: Primary | ICD-10-CM

## 2022-10-24 DIAGNOSIS — E78.5 HYPERLIPIDEMIA, UNSPECIFIED HYPERLIPIDEMIA TYPE: ICD-10-CM

## 2022-10-24 DIAGNOSIS — I25.2 HISTORY OF ACUTE ANTERIOR WALL MI: ICD-10-CM

## 2022-10-24 DIAGNOSIS — I10 PRIMARY HYPERTENSION: ICD-10-CM

## 2022-10-24 DIAGNOSIS — F17.210 CIGARETTE SMOKER: ICD-10-CM

## 2022-10-24 DIAGNOSIS — I25.10 CORONARY ARTERY DISEASE INVOLVING NATIVE CORONARY ARTERY OF NATIVE HEART WITHOUT ANGINA PECTORIS: ICD-10-CM

## 2022-10-24 LAB
ALBUMIN SERPL-MCNC: 4 G/DL (ref 3.5–5.2)
ALBUMIN/GLOB SERPL: 1.6 G/DL
ALP SERPL-CCNC: 108 U/L (ref 39–117)
ALT SERPL W P-5'-P-CCNC: 12 U/L (ref 1–41)
ANION GAP SERPL CALCULATED.3IONS-SCNC: 9.2 MMOL/L (ref 5–15)
AST SERPL-CCNC: 13 U/L (ref 1–40)
BILIRUB SERPL-MCNC: 0.3 MG/DL (ref 0–1.2)
BUN SERPL-MCNC: 15 MG/DL (ref 8–23)
BUN/CREAT SERPL: 14.6 (ref 7–25)
CALCIUM SPEC-SCNC: 9.4 MG/DL (ref 8.6–10.5)
CHLORIDE SERPL-SCNC: 104 MMOL/L (ref 98–107)
CHOLEST SERPL-MCNC: 125 MG/DL (ref 0–200)
CO2 SERPL-SCNC: 26.8 MMOL/L (ref 22–29)
CREAT SERPL-MCNC: 1.03 MG/DL (ref 0.76–1.27)
EGFRCR SERPLBLD CKD-EPI 2021: 78.6 ML/MIN/1.73
GLOBULIN UR ELPH-MCNC: 2.5 GM/DL
GLUCOSE SERPL-MCNC: 84 MG/DL (ref 65–99)
HBA1C MFR BLD: 5.5 % (ref 4.8–5.6)
HDLC SERPL-MCNC: 36 MG/DL (ref 40–60)
LDLC SERPL CALC-MCNC: 74 MG/DL (ref 0–100)
LDLC/HDLC SERPL: 2.07 {RATIO}
POTASSIUM SERPL-SCNC: 4.4 MMOL/L (ref 3.5–5.2)
PROT SERPL-MCNC: 6.5 G/DL (ref 6–8.5)
SODIUM SERPL-SCNC: 140 MMOL/L (ref 136–145)
TRIGL SERPL-MCNC: 72 MG/DL (ref 0–150)
VLDLC SERPL-MCNC: 15 MG/DL (ref 5–40)

## 2022-10-24 PROCEDURE — 93000 ELECTROCARDIOGRAM COMPLETE: CPT | Performed by: NURSE PRACTITIONER

## 2022-10-24 PROCEDURE — 80061 LIPID PANEL: CPT

## 2022-10-24 PROCEDURE — 99214 OFFICE O/P EST MOD 30 MIN: CPT | Performed by: NURSE PRACTITIONER

## 2022-10-24 PROCEDURE — 80053 COMPREHEN METABOLIC PANEL: CPT

## 2022-10-24 PROCEDURE — 83036 HEMOGLOBIN GLYCOSYLATED A1C: CPT

## 2022-10-24 PROCEDURE — 36415 COLL VENOUS BLD VENIPUNCTURE: CPT

## 2022-10-24 RX ORDER — ATORVASTATIN CALCIUM 40 MG/1
40 TABLET, FILM COATED ORAL DAILY
Qty: 90 TABLET | Refills: 4 | Status: SHIPPED | OUTPATIENT
Start: 2022-10-24 | End: 2023-03-28

## 2022-10-24 RX ORDER — VARENICLINE TARTRATE 1 MG/1
1 TABLET, FILM COATED ORAL 2 TIMES DAILY
Qty: 56 TABLET | Refills: 1 | Status: SHIPPED | OUTPATIENT
Start: 2022-11-21 | End: 2022-10-24

## 2022-10-24 RX ORDER — VARENICLINE TARTRATE 1 MG/1
TABLET, FILM COATED ORAL
Qty: 180 TABLET | Refills: 2 | Status: SHIPPED | OUTPATIENT
Start: 2022-10-24

## 2022-10-24 NOTE — PROGRESS NOTES
Date of Office Visit: 10/24/2022  Encounter Provider: ANDRÉS Castellanos  Place of Service: [facility] CARDIOLOGY  Patient Name: Kieran Hdez  :1955    Chief Complaint   Patient presents with   • Follow-up   • Coronary Artery Disease   :     HPI: Kieran Hdez is a 67 y.o. male who is a patient of Dr. Odonnell and is known to me from previous.  He has a history of coronary disease and had PCI to the mid and distal RCA in 2020 after an ST elevation MI.  He also has hyperlipidemia and hypertension as well as continued tobacco abuse.     He comes in today for follow up. He has asymptomatic. He is still smoking but wants to quit. He denies chest pain or palpitations. He is not exercising regularly.  Previous testing and notes have been reviewed by me.   Past Medical History:   Diagnosis Date   • CAD (coronary artery disease)    • Hyperlipidemia    • Hypertension    • Ischemic cardiomyopathy    • MI (myocardial infarction) ([facility]) 2020    inferior posterior lateral   • Tobacco abuse        Past Surgical History:   Procedure Laterality Date   • CARDIAC CATHETERIZATION N/A 4/3/2020    Procedure: Left Heart Cath;  Surgeon: Juan Odonnell MD;  Location: [facility] INVASIVE LOCATION;  Service: Cardiovascular;  Laterality: N/A;   • CARDIAC CATHETERIZATION N/A 4/3/2020    Procedure: Stent KEVIN coronary;  Surgeon: Juan Odonnell MD;  Location: [facility] CATH INVASIVE LOCATION;  Service: Cardiovascular;  Laterality: N/A;   • CARDIAC CATHETERIZATION N/A 4/3/2020    Procedure: Coronary angiography;  Surgeon: Juan Odonnell MD;  Location: [facility] CATH INVASIVE LOCATION;  Service: Cardiovascular;  Laterality: N/A;   • CARDIAC CATHETERIZATION N/A 4/3/2020    Procedure: Left ventriculography;  Surgeon: Juan Odonnell MD;  Location: [facility] CATH INVASIVE LOCATION;  Service: Cardiovascular;  Laterality: N/A;   • HERNIA REPAIR         Social History     Socioeconomic  "History   • Marital status:    Tobacco Use   • Smoking status: Every Day     Packs/day: 0.25     Types: Cigarettes   • Smokeless tobacco: Never   • Tobacco comments:     CAFFEINE USE: 4 CUPS COFFEE DAILY   Substance and Sexual Activity   • Alcohol use: No   • Drug use: No   • Sexual activity: Yes     Partners: Female       Family History   Problem Relation Age of Onset   • Emphysema Mother    • COPD Mother    • Heart attack Father    • COPD Brother    • Emphysema Brother        Review of Systems   Constitutional: Negative for diaphoresis and malaise/fatigue.   Cardiovascular: Negative for chest pain, claudication, dyspnea on exertion, irregular heartbeat, leg swelling, near-syncope, orthopnea, palpitations, paroxysmal nocturnal dyspnea and syncope.   Respiratory: Negative for cough, shortness of breath and sleep disturbances due to breathing.    Musculoskeletal: Negative for falls.   Neurological: Negative for dizziness and weakness.   Psychiatric/Behavioral: Negative for altered mental status and substance abuse.       No Known Allergies      Current Outpatient Medications:   •  atorvastatin (LIPITOR) 40 MG tablet, TAKE 1 TABLET BY MOUTH EVERY DAY, Disp: 90 tablet, Rfl: 4  •  clopidogrel (PLAVIX) 75 MG tablet, TAKE 1 TABLET BY MOUTH EVERY DAY, Disp: 90 tablet, Rfl: 1  •  DOCUSATE CALCIUM PO, Take  by mouth As Needed., Disp: , Rfl:   •  lisinopril (PRINIVIL,ZESTRIL) 20 MG tablet, Take 1 tablet by mouth 2 (Two) Times a Day., Disp: 180 tablet, Rfl: 3  •  metoprolol succinate XL (TOPROL-XL) 25 MG 24 hr tablet, TAKE 1 TABLET BY MOUTH EVERY DAY, Disp: 90 tablet, Rfl: 4  •  senna-docusate sodium (SENOKOT-S) 8.6-50 MG tablet, Take 2 tablets by mouth Daily., Disp: 60 tablet, Rfl: 5  •  Wheat Dextrin (BENEFIBER DRINK MIX PO), Take  by mouth As Needed., Disp: , Rfl:       Objective:     Vitals:    10/24/22 1307   BP: 129/80   Pulse: 60   SpO2: 99%   Weight: 59.9 kg (132 lb)   Height: 182.9 cm (72\")     Body mass index " is 17.9 kg/m².    PHYSICAL EXAM:    Constitutional:       General: Not in acute distress.     Appearance: Normal appearance. Well-developed.   Eyes:      Pupils: Pupils are equal, round, and reactive to light.   HENT:      Head: Normocephalic.   Neck:      Vascular: No carotid bruit or JVD.   Pulmonary:      Effort: Pulmonary effort is normal. No tachypnea.      Breath sounds: Normal breath sounds. No wheezing. No rales.   Cardiovascular:      Normal rate. Regular rhythm.      No gallop.   Pulses:     Intact distal pulses.   Abdominal:      General: Bowel sounds are normal.      Palpations: Abdomen is soft.      Tenderness: There is no abdominal tenderness.   Musculoskeletal: Normal range of motion.      Cervical back: Normal range of motion and neck supple. No edema. Skin:     General: Skin is warm and dry.   Neurological:      Mental Status: Alert and oriented to person, place, and time.           ECG 12 Lead    Date/Time: 10/24/2022 1:17 PM  Performed by: Cinthya Shelley APRN  Authorized by: Cinthya Shelley APRN   Comparison: compared with previous ECG from 7/22/2022  Similar to previous ECG  Rhythm: sinus rhythm  Rate: normal  Q waves: V1 and V2    QRS axis: normal    Clinical impression: non-specific ECG              Assessment:       Diagnosis Plan   1. Primary hypertension        2. Coronary artery disease involving native coronary artery of native heart without angina pectoris        3. History of acute anterior wall MI        4. Hyperlipidemia, unspecified hyperlipidemia type        5. Cigarette smoker          No orders of the defined types were placed in this encounter.         Plan:       I am going to start him on chantix for smoking cessation. I encouraged him to start walking everyday 30 mins. He and his wife agree to do it together. There is a school across the street from their house. Continue with aggressive risk factor modification.         Your medication list          Accurate as of  October 24, 2022  1:17 PM. If you have any questions, ask your nurse or doctor.            CONTINUE taking these medications      Instructions Last Dose Given Next Dose Due   atorvastatin 40 MG tablet  Commonly known as: LIPITOR      TAKE 1 TABLET BY MOUTH EVERY DAY       BENEFIBER DRINK MIX PO      Take  by mouth As Needed.       clopidogrel 75 MG tablet  Commonly known as: PLAVIX      TAKE 1 TABLET BY MOUTH EVERY DAY       DOCUSATE CALCIUM PO      Take  by mouth As Needed.       lisinopril 20 MG tablet  Commonly known as: PRINIVIL,ZESTRIL      Take 1 tablet by mouth 2 (Two) Times a Day.       metoprolol succinate XL 25 MG 24 hr tablet  Commonly known as: TOPROL-XL      TAKE 1 TABLET BY MOUTH EVERY DAY       sennosides-docusate 8.6-50 MG per tablet  Commonly known as: PERICOLACE      Take 2 tablets by mouth Daily.                As always, it has been a pleasure to participate in your patient's care.      Sincerely,     Cinthya BOWEN

## 2022-11-04 RX ORDER — CLOPIDOGREL BISULFATE 75 MG/1
TABLET ORAL
Qty: 90 TABLET | Refills: 1 | Status: SHIPPED | OUTPATIENT
Start: 2022-11-04 | End: 2023-03-28

## 2023-03-28 RX ORDER — CLOPIDOGREL BISULFATE 75 MG/1
TABLET ORAL
Qty: 90 TABLET | Refills: 1 | Status: SHIPPED | OUTPATIENT
Start: 2023-03-28

## 2023-03-28 RX ORDER — ATORVASTATIN CALCIUM 40 MG/1
TABLET, FILM COATED ORAL
Qty: 90 TABLET | Refills: 4 | Status: SHIPPED | OUTPATIENT
Start: 2023-03-28

## 2023-03-28 RX ORDER — METOPROLOL SUCCINATE 25 MG/1
TABLET, EXTENDED RELEASE ORAL
Qty: 90 TABLET | Refills: 4 | Status: SHIPPED | OUTPATIENT
Start: 2023-03-28

## 2023-06-15 ENCOUNTER — OFFICE VISIT (OUTPATIENT)
Dept: CARDIOLOGY | Facility: CLINIC | Age: 68
End: 2023-06-15
Payer: MEDICARE

## 2023-06-15 VITALS
HEIGHT: 72 IN | HEART RATE: 70 BPM | WEIGHT: 131.4 LBS | BODY MASS INDEX: 17.8 KG/M2 | DIASTOLIC BLOOD PRESSURE: 78 MMHG | SYSTOLIC BLOOD PRESSURE: 128 MMHG

## 2023-06-15 DIAGNOSIS — I10 PRIMARY HYPERTENSION: Primary | ICD-10-CM

## 2023-06-15 DIAGNOSIS — F17.210 CIGARETTE SMOKER: ICD-10-CM

## 2023-06-15 DIAGNOSIS — E78.5 HYPERLIPIDEMIA, UNSPECIFIED HYPERLIPIDEMIA TYPE: ICD-10-CM

## 2023-06-15 DIAGNOSIS — I25.10 CORONARY ARTERY DISEASE INVOLVING NATIVE CORONARY ARTERY OF NATIVE HEART WITHOUT ANGINA PECTORIS: ICD-10-CM

## 2023-06-15 NOTE — PROGRESS NOTES
Date of Office Visit: 06/15/23    Encounter Provider: Juan Odonnell MD  Place of Service: [facility] CARDIOLOGY  Patient Name: Kieran Hdez  :1955    Chief Complaint   Patient presents with    Coronary Artery Disease       HPI: Kieran Hdez is a 68 y.o. male patient with a history of coronary disease and had PCI to the mid and distal RCA in 2020 after an ST elevation MI.  He also has hyperlipidemia and hypertension as well as continued tobacco abuse.   On 4/3/2020, he presented with severe chest pressure. He was noted to have an inferior posterior STEMI and was subsequently taken to the cardiac catheterization lab. This revealed a normal left main, 30 to 40% proximal LAD, acutely occluded distal RCA, 80% mid RCA, and a small caliber PDA with discrete 95% distal stenosis. He had normal LV function with inferior wall akinesis. He underwent successful PCI of the mid and distal RCA with 4.0 x 18 mm Xience Aisha drug-eluting stent in both segments. This was postdilated with a 4.5 mm noncompliant trek balloon. He was placed on dual antiplatelet therapy with aspirin and Plavix which he will continue for at least 1 year. Post procedure echocardiogram revealed an EF of 40% and no significant valvular abnormalities   Last echocardiogram in  showed normalization of his left ventricular systolic function.  He has continued on Plavix monotherapy along with lisinopril and Toprol.  His blood pressure and heart rate are well controlled.  He is not having any issues with chest pain.    Previous testing and notes have been reviewed by me.   Past Medical History:   Diagnosis Date    CAD (coronary artery disease)     Hyperlipidemia     Hypertension     Ischemic cardiomyopathy     MI (myocardial infarction) 2020    inferior posterior lateral    Tobacco abuse        Past Surgical History:   Procedure Laterality Date    CARDIAC CATHETERIZATION N/A 4/3/2020    Procedure: Left Heart  Cath;  Surgeon: Juan Odonnell MD;  Location: [facility] CATH INVASIVE LOCATION;  Service: Cardiovascular;  Laterality: N/A;    CARDIAC CATHETERIZATION N/A 4/3/2020    Procedure: Stent KEVIN coronary;  Surgeon: Juan Odonnell MD;  Location:  [facility] CATH INVASIVE LOCATION;  Service: Cardiovascular;  Laterality: N/A;    CARDIAC CATHETERIZATION N/A 4/3/2020    Procedure: Coronary angiography;  Surgeon: Jaun Odonnell MD;  Location:  [facility] CATH INVASIVE LOCATION;  Service: Cardiovascular;  Laterality: N/A;    CARDIAC CATHETERIZATION N/A 4/3/2020    Procedure: Left ventriculography;  Surgeon: Juan Odonnell MD;  Location:  [facility] CATH INVASIVE LOCATION;  Service: Cardiovascular;  Laterality: N/A;    HERNIA REPAIR         Social History     Socioeconomic History    Marital status:    Tobacco Use    Smoking status: Every Day     Packs/day: 0.25     Types: Cigarettes    Smokeless tobacco: Never    Tobacco comments:     CAFFEINE USE: 4 CUPS COFFEE DAILY   Substance and Sexual Activity    Alcohol use: No    Drug use: No    Sexual activity: Yes     Partners: Female       Family History   Problem Relation Age of Onset    Emphysema Mother     COPD Mother     Heart attack Father     COPD Brother     Emphysema Brother        Review of Systems   Constitutional: Negative for diaphoresis, fever and malaise/fatigue.   HENT:  Negative for nosebleeds and sore throat.    Eyes:  Negative for blurred vision and double vision.   Cardiovascular:  Negative for chest pain, claudication, dyspnea on exertion, irregular heartbeat, leg swelling, near-syncope, orthopnea, palpitations, paroxysmal nocturnal dyspnea and syncope.   Respiratory:  Negative for cough, shortness of breath, sleep disturbances due to breathing and snoring.    Endocrine: Negative for cold intolerance, heat intolerance and polydipsia.   Skin:  Negative for itching, poor wound healing and rash.   Musculoskeletal:  Negative for falls, joint pain,  "joint swelling, muscle weakness and myalgias.   Gastrointestinal:  Negative for abdominal pain, melena, nausea and vomiting.   Neurological:  Negative for dizziness, light-headedness, loss of balance, seizures, vertigo and weakness.   Psychiatric/Behavioral:  Negative for altered mental status, depression and substance abuse.      No Known Allergies      Current Outpatient Medications:     atorvastatin (LIPITOR) 40 MG tablet, TAKE 1 TABLET BY MOUTH EVERY DAY, Disp: 90 tablet, Rfl: 4    clopidogrel (PLAVIX) 75 MG tablet, TAKE 1 TABLET BY MOUTH EVERY DAY, Disp: 90 tablet, Rfl: 1    lisinopril (PRINIVIL,ZESTRIL) 20 MG tablet, Take 1 tablet by mouth 2 (Two) Times a Day., Disp: 180 tablet, Rfl: 3    metoprolol succinate XL (TOPROL-XL) 25 MG 24 hr tablet, TAKE 1 TABLET BY MOUTH EVERY DAY, Disp: 90 tablet, Rfl: 4    senna-docusate sodium (SENOKOT-S) 8.6-50 MG tablet, Take 2 tablets by mouth Daily., Disp: 60 tablet, Rfl: 5    Wheat Dextrin (BENEFIBER DRINK MIX PO), Take  by mouth As Needed., Disp: , Rfl:     DOCUSATE CALCIUM PO, Take  by mouth As Needed. (Patient not taking: Reported on 6/15/2023), Disp: , Rfl:     varenicline (CHANTIX) 1 MG tablet, TAKE 1 TABLET BY MOUTH TWICE DAILY FOR 56 DAYS (Patient not taking: Reported on 6/15/2023), Disp: 180 tablet, Rfl: 2      Objective:     Vitals:    06/15/23 1224   BP: 128/78   Pulse: 70   Weight: 59.6 kg (131 lb 6.4 oz)   Height: 182.9 cm (72\")     Body mass index is 17.82 kg/m².    PHYSICAL EXAM:    Constitutional:       General: Not in acute distress.     Appearance: Normal appearance. Well-developed.   Eyes:      Pupils: Pupils are equal, round, and reactive to light.   HENT:      Head: Normocephalic.   Neck:      Vascular: No carotid bruit or JVD.   Pulmonary:      Effort: Pulmonary effort is normal. No tachypnea.      Breath sounds: Normal breath sounds. No wheezing. No rales.   Cardiovascular:      Normal rate. Regular rhythm.      No gallop.    Pulses:     Intact distal " pulses.   Abdominal:      General: Bowel sounds are normal.      Palpations: Abdomen is soft.      Tenderness: There is no abdominal tenderness.   Musculoskeletal: Normal range of motion.      Cervical back: Normal range of motion and neck supple. No edema. Skin:     General: Skin is warm and dry.   Neurological:      Mental Status: Alert and oriented to person, place, and time.         ECG 12 Lead    Date/Time: 6/15/2023 12:37 PM  Performed by: Juan Odonnell MD  Authorized by: Juan Odonnell MD   Comparison: compared with previous ECG from 10/24/2022  Similar to previous ECG  Rhythm: sinus rhythm  Ectopy: atrial premature contractions  Rate: normal  Other findings: bilateral atrial abnormality        8/6/2020  Left ventricular systolic function is normal. Calculated EF = 61.8%. Estimated EF was in agreement with the calculated EF. Estimated EF = 62%. Normal left ventricular cavity size and wall thickness noted. All left ventricular wall segments contract normally. Left ventricular diastolic dysfunction is noted (grade I) consistent with impaired relaxation.  There is moderate calcification of the aortic valve.No aortic valve regurgitation is present. No aortic valve stenosis is present.      4/3/2020  Conclusions:   1. Left main: Normal  2. LAD: 30 to 40% proximal stenosis  3. LCX: Small caliber.  Normal.  4. RCA: Acutely occluded distal segment.  80% mid vessel stenosis.  Small caliber PDA with discrete 95% distal stenosis.  5.  Normal left ventricular size and systolic function.  Inferior wall akinesis  6.  Successful PCI of the mid and distal RCA with 4.0 x 18 mm Xience Aisha drug-eluting stent in both segments.  Postdilated with a 4.5 mm noncompliant trek balloon      Assessment:     Plan:   68 year-old male with a medical history of coronary disease with ST elevation MI, PCI of the mid and distal RCA, ischemic cardiomyopathy with last ejection fraction of 40%, with tobacco abuse and  hypertension, who presents back for follow-up.  He is doing well.  He denies any chest pain.  His blood pressure and heart rate are well controlled.     1.  Coronary artery disease.  He is status post STEMI and drug-eluting stent placement to his mid and distal RCA..  No angina currently.  -He remains on Plavix monotherapy.  Continue.  - Continue Lipitor 40 mg p.o. nightly along with lisinopril and metoprolol succinate at current dose.  Blood pressure and heart rate are well controlled.  -LDL was 74 in October 2022.  Reasonable control.     2.  Ischemic cardiomyopathy.  Left ventricular ejection fraction now normalized.  He is on goal-directed medical therapy with Toprol and lisinopril. -Euvolemic.  No indication for diuretic therapy.     3.  Tobacco abuse.  We discussed the importance of smoking cessation.       4.  Hypertension: At goal.  No change in current regimen.    I will see him back in 1 year.       Your medication list            Accurate as of Fouzia 15, 2023 12:35 PM. If you have any questions, ask your nurse or doctor.                CONTINUE taking these medications        Instructions Last Dose Given Next Dose Due   atorvastatin 40 MG tablet  Commonly known as: LIPITOR      TAKE 1 TABLET BY MOUTH EVERY DAY       BENEFIBER DRINK MIX PO      Take  by mouth As Needed.       clopidogrel 75 MG tablet  Commonly known as: PLAVIX      TAKE 1 TABLET BY MOUTH EVERY DAY       DOCUSATE CALCIUM PO      Take  by mouth As Needed.       lisinopril 20 MG tablet  Commonly known as: PRINIVIL,ZESTRIL      Take 1 tablet by mouth 2 (Two) Times a Day.       metoprolol succinate XL 25 MG 24 hr tablet  Commonly known as: TOPROL-XL      TAKE 1 TABLET BY MOUTH EVERY DAY       sennosides-docusate 8.6-50 MG per tablet  Commonly known as: PERICOLACE      Take 2 tablets by mouth Daily.       varenicline 1 MG tablet  Commonly known as: CHANTIX      TAKE 1 TABLET BY MOUTH TWICE DAILY FOR 56 DAYS

## 2023-11-13 RX ORDER — LISINOPRIL 20 MG/1
20 TABLET ORAL 2 TIMES DAILY
Qty: 180 TABLET | Refills: 3 | Status: SHIPPED | OUTPATIENT
Start: 2023-11-13

## 2024-02-13 RX ORDER — CLOPIDOGREL BISULFATE 75 MG/1
TABLET ORAL
Qty: 90 TABLET | Refills: 4 | Status: SHIPPED | OUTPATIENT
Start: 2024-02-13

## 2024-03-08 DIAGNOSIS — I72.4 ANEURYSM OF ARTERY OF LOWER EXTREMITY: Primary | ICD-10-CM

## 2024-03-08 DIAGNOSIS — I65.23 BILATERAL CAROTID ARTERY STENOSIS: Primary | ICD-10-CM

## 2024-04-22 ENCOUNTER — OFFICE VISIT (OUTPATIENT)
Age: 69
End: 2024-04-22
Payer: MEDICARE

## 2024-04-22 ENCOUNTER — HOSPITAL ENCOUNTER (OUTPATIENT)
Facility: HOSPITAL | Age: 69
Discharge: HOME | End: 2024-04-22
Payer: MEDICARE

## 2024-04-22 VITALS
HEIGHT: 72 IN | BODY MASS INDEX: 17.74 KG/M2 | HEART RATE: 49 BPM | WEIGHT: 131 LBS | SYSTOLIC BLOOD PRESSURE: 149 MMHG | DIASTOLIC BLOOD PRESSURE: 88 MMHG

## 2024-04-22 DIAGNOSIS — M79.605 LEG PAIN, BILATERAL: ICD-10-CM

## 2024-04-22 DIAGNOSIS — I72.4 ANEURYSM OF ARTERY OF LOWER EXTREMITY: ICD-10-CM

## 2024-04-22 DIAGNOSIS — I10 PRIMARY HYPERTENSION: ICD-10-CM

## 2024-04-22 DIAGNOSIS — I71.41 PARARENAL ABDOMINAL AORTIC ANEURYSM (AAA) WITHOUT RUPTURE: Primary | ICD-10-CM

## 2024-04-22 DIAGNOSIS — I65.23 BILATERAL CAROTID ARTERY STENOSIS: ICD-10-CM

## 2024-04-22 DIAGNOSIS — F17.210 CIGARETTE SMOKER: ICD-10-CM

## 2024-04-22 DIAGNOSIS — Z00.00 WELLNESS EXAMINATION: ICD-10-CM

## 2024-04-22 DIAGNOSIS — M79.604 LEG PAIN, BILATERAL: ICD-10-CM

## 2024-04-22 LAB
ABDOMINAL DIST AORTA AP: 3.91 CM
ABDOMINAL DIST AORTA TRANS: 4.23 CM
ABDOMINAL DIST AORTA VEL: 46.1 CM/S
ABDOMINAL LT COM ILIAC AP: 1.1 CM
ABDOMINAL LT COM ILIAC TRANS: 1.16 CM
ABDOMINAL LT COM ILIAC VEL: 126.6 CM/S
ABDOMINAL LT EXT ILIAC VEL: 431.9 CM/S
ABDOMINAL LT INT ILIAC VEL: 249.5 CM/S
ABDOMINAL MID AORTA AP: 3.07 CM
ABDOMINAL MID AORTA TRANS: 2.95 CM
ABDOMINAL MID AORTA VEL: 52.2 CM/S
ABDOMINAL PROX AORTA AP: 3 CM
ABDOMINAL PROX AORTA TRANS: 3.01 CM
ABDOMINAL PROX AORTA VEL: 50.4 CM/S
ABDOMINAL RT COM ILIAC AP: 1.23 CM
ABDOMINAL RT COM ILIAC TRANS: 1.4 CM
ABDOMINAL RT COM ILIAC VEL: 179.3 CM/S
ABDOMINAL RT EXT ILIAC VEL: 177.9 CM/S
ABDOMINAL RT INT ILIAC VEL: 176.7 CM/S
BH CV VAS ABD AO LT EXTERNAL ILIAC AP: 0.8 CM
BH CV VAS ABD AO LT INTERNAL ILIAC AP: 0.6 CM
BH CV VAS ABD AO RT EXTERNAL ILIAC AP: 0.89 CM
BH CV VAS ABD AO RT INTERNAL ILIAC AP: 0.52 CM
BH CV VAS SCREENING CAROTID CCA LEFT: 65.9 CM/SEC
BH CV VAS SCREENING CAROTID CCA RIGHT: 52.1 CM/SEC
BH CV VAS SCREENING CAROTID ICA LEFT: 89.4 CM/SEC
BH CV VAS SCREENING CAROTID ICA RIGHT: 88 CM/SEC
BH CV XLRA MEAS LEFT DIST CCA EDV: 15.4 CM/SEC
BH CV XLRA MEAS LEFT DIST CCA PSV: 65.9 CM/SEC
BH CV XLRA MEAS LEFT DIST ICA EDV: -27.5 CM/SEC
BH CV XLRA MEAS LEFT DIST ICA PSV: -89.4 CM/SEC
BH CV XLRA MEAS LEFT ICA/CCA RATIO: 1.36
BH CV XLRA MEAS LEFT MID ICA EDV: -25.6 CM/SEC
BH CV XLRA MEAS LEFT MID ICA PSV: -81.1 CM/SEC
BH CV XLRA MEAS LEFT PROX CCA EDV: 14.5 CM/SEC
BH CV XLRA MEAS LEFT PROX CCA PSV: 63.2 CM/SEC
BH CV XLRA MEAS LEFT PROX ECA EDV: -10.1 CM/SEC
BH CV XLRA MEAS LEFT PROX ECA PSV: -75.5 CM/SEC
BH CV XLRA MEAS LEFT PROX ICA EDV: -23.1 CM/SEC
BH CV XLRA MEAS LEFT PROX ICA PSV: -66.6 CM/SEC
BH CV XLRA MEAS LEFT VERTEBRAL A EDV: 11.4 CM/SEC
BH CV XLRA MEAS LEFT VERTEBRAL A PSV: 50.6 CM/SEC
BH CV XLRA MEAS RIGHT DIST CCA EDV: 11.8 CM/SEC
BH CV XLRA MEAS RIGHT DIST CCA PSV: 52.1 CM/SEC
BH CV XLRA MEAS RIGHT DIST ICA EDV: -28.5 CM/SEC
BH CV XLRA MEAS RIGHT DIST ICA PSV: -88 CM/SEC
BH CV XLRA MEAS RIGHT ICA/CCA RATIO: 1.69
BH CV XLRA MEAS RIGHT MID ICA EDV: -21.1 CM/SEC
BH CV XLRA MEAS RIGHT MID ICA PSV: -72.2 CM/SEC
BH CV XLRA MEAS RIGHT PROX ECA EDV: -9.8 CM/SEC
BH CV XLRA MEAS RIGHT PROX ECA PSV: -69.8 CM/SEC
BH CV XLRA MEAS RIGHT PROX ICA EDV: 16.8 CM/SEC
BH CV XLRA MEAS RIGHT PROX ICA PSV: 57.8 CM/SEC
BH CV XLRA MEAS RIGHT VERTEBRAL A EDV: 21.1 CM/SEC
BH CV XLRA MEAS RIGHT VERTEBRAL A PSV: 62.9 CM/SEC

## 2024-04-22 PROCEDURE — 1159F MED LIST DOCD IN RCRD: CPT | Performed by: NURSE PRACTITIONER

## 2024-04-22 PROCEDURE — 93799 UNLISTED CV SVC/PROCEDURE: CPT

## 2024-04-22 PROCEDURE — 93978 VASCULAR STUDY: CPT

## 2024-04-22 PROCEDURE — G2211 COMPLEX E/M VISIT ADD ON: HCPCS | Performed by: NURSE PRACTITIONER

## 2024-04-22 PROCEDURE — 99214 OFFICE O/P EST MOD 30 MIN: CPT | Performed by: NURSE PRACTITIONER

## 2024-04-22 PROCEDURE — 1160F RVW MEDS BY RX/DR IN RCRD: CPT | Performed by: NURSE PRACTITIONER

## 2024-04-22 PROCEDURE — 3077F SYST BP >= 140 MM HG: CPT | Performed by: NURSE PRACTITIONER

## 2024-04-22 PROCEDURE — 3079F DIAST BP 80-89 MM HG: CPT | Performed by: NURSE PRACTITIONER

## 2024-04-22 NOTE — PROGRESS NOTES
Chief Complaint  Abdominal aortic aneurysm.    Subjective        Kieran Hdez presents to [facility] VASCULAR SURGERY  HPI   Kieran Hdez is a 68 y.o. male that has been followed in our office for an abdominal aortic aneurysm. He returns today in follow-up along with an aortic duplex.  We also checked a carotid screening due to his history of tobacco abuse.  He   reports he  has been doing well without hospitalizations or surgeries. He  denies any worsening abdominal pain, back pain, or pain that radiates to his groin. He denies any rest pain or tissue loss.  He states that he was in [place] last week and walks more than normal, and felt his legs more tired and weak.  These were not particular lifestyle limiting.  He reports he has not ambulating much since he retired.    Review of Systems   Constitutional:  Negative for fever.   Eyes:  Negative for visual disturbance.   Cardiovascular:  Negative for leg swelling.   Gastrointestinal:  Negative for abdominal pain.   Musculoskeletal:  Negative for back pain.   Skin:  Negative for color change, pallor and wound.   Neurological:  Negative for dizziness, facial asymmetry, speech difficulty and weakness.        Kieran Hdez  reports that he has been smoking cigarettes. He has never used smokeless tobacco..   Tobacco Education/Cessation: Kieran Hdez  reports that he has been smoking cigarettes. He has never used smokeless tobacco. I have educated him on the risk of diseases from using tobacco products such as arterial disease.     He reports that he is thinking about quitting.  He does not have a plan at this point.  He was not interested in speaking making a plan.    I spent 3  minutes counseling the patient.           Objective   Vital Signs:  Vitals:    04/22/24 1002   BP: 149/88   Pulse: (!) 49      Body mass index is 17.77 kg/m².   BMI is below normal parameters (malnutrition). Recommendations: Information on healthy weight added to  patient's after visit summary       Physical Exam  Vitals reviewed.   Constitutional:       Appearance: Normal appearance.   HENT:      Head: Normocephalic.   Cardiovascular:      Rate and Rhythm: Normal rate and regular rhythm.      Pulses:           Dorsalis pedis pulses are detected w/ Doppler on the right side and detected w/ Doppler on the left side.        Posterior tibial pulses are detected w/ Doppler on the right side and detected w/ Doppler on the left side.   Pulmonary:      Effort: Pulmonary effort is normal.   Skin:     General: Skin is warm.   Neurological:      General: No focal deficit present.      Mental Status: He is alert and oriented to person, place, and time.   Psychiatric:         Mood and Affect: Mood normal.          Result Review :      Previous aortic duplex: 3.95 cm     Aortic duplex done today: 4.23 cm.  Each common iliac artery is 1.23 cm and the left is 1.1 cm.    Carotid screening done today shows normal velocities bilaterally.                    Assessment and Plan     Diagnoses and all orders for this visit:    1. Pararenal abdominal aortic aneurysm (AAA) without rupture (Primary)  -     Doppler Ankle Brachial Index Single Level CAR; Future  -     Duplex Aorta IVC Iliac Graft Complete CAR; Future    2. Cigarette smoker    3. Aneurysm of artery of lower extremity  -     Doppler Ankle Brachial Index Single Level CAR; Future    4. Leg pain, bilateral    5. Primary hypertension      Patient presents today for follow-up of his abdominal aortic aneurysm.  He has had growth from 3.95 to 4.23 cm.  For this reason, I recommended a 6-month follow-up.  He had a carotid screening today which was normal.  He does complain of heaviness to his bilateral lower extremities when he walks long distances.  He does not have easily palpable pedal pulses therefore we will check ABIs at the next visit as well.  His blood pressure is elevated today, though he was n.p.o. for his ultrasound today and has not  had his medication.  We discussed blood pressure control and the role it plays on aneurysmal growth.  I have also recommended absolute smoking cessation.  He will follow-up in 6 months.          Follow Up     Return in about 6 months (around 10/22/2024) for aortic duplex, randy.  Patient was given instructions and counseling regarding his condition or for health maintenance advice. Please see specific information pulled into the AVS if appropriate.     ANDRÉS Arce

## 2024-04-22 NOTE — PATIENT INSTRUCTIONS
"\"2-1-7-Almost None!\"  Healthy Habits Start Early    EAT 5 OR MORE SERVINGS OF VEGETABLES AND FRUITS EVERY DAY.    Help Kieran get three vegetables and two fruits each day. Red, green, yellow, orange...encourage them to try all the colors so they can enjoy different flavors and get more vitamins.    How can I help Kieran do this?  ---------------------------------------------  -BE PATIENT WITH Kieran, remember it may take 10 times before they start to like new food. So, start with small bites and just keep trying.  -Serve at least one vegetable or fruit at every meal. Even try two. Remember, portions do not have to be as big as you think.  -Encourage eating fruits and vegetables instead of drinking them..it's a better way to get fiber and vitamins..so limit the amount of juice to 1/2 cup per day for children 1-6 years and one cup per day for children 7-18 years of age. Try using 1/2 part water and 1/2 part juice.    Spend less than two hours per day watching television and other screen media. Screen media includes video games, movies and computer use for entertainment.    How can I help Kieran do this?  -Turn off the TV at dinner. Dinner is the best time to hang out with your kids and just talk, learn about their day, and tell them about your day. Your kids have a lot to learn from you and dinner is a great time to share.  "

## 2024-06-17 ENCOUNTER — OFFICE VISIT (OUTPATIENT)
Age: 69
End: 2024-06-17
Payer: MEDICARE

## 2024-06-17 VITALS
OXYGEN SATURATION: 98 % | HEART RATE: 68 BPM | SYSTOLIC BLOOD PRESSURE: 130 MMHG | BODY MASS INDEX: 16.31 KG/M2 | DIASTOLIC BLOOD PRESSURE: 80 MMHG | HEIGHT: 72 IN | WEIGHT: 120.4 LBS

## 2024-06-17 DIAGNOSIS — I25.10 CORONARY ARTERY DISEASE INVOLVING NATIVE CORONARY ARTERY OF NATIVE HEART WITHOUT ANGINA PECTORIS: ICD-10-CM

## 2024-06-17 DIAGNOSIS — I72.4 ANEURYSM OF ARTERY OF LOWER EXTREMITY: Primary | ICD-10-CM

## 2024-06-17 PROCEDURE — 93000 ELECTROCARDIOGRAM COMPLETE: CPT | Performed by: INTERNAL MEDICINE

## 2024-06-17 PROCEDURE — 99214 OFFICE O/P EST MOD 30 MIN: CPT | Performed by: INTERNAL MEDICINE

## 2024-06-17 PROCEDURE — 3075F SYST BP GE 130 - 139MM HG: CPT | Performed by: INTERNAL MEDICINE

## 2024-06-17 PROCEDURE — 3079F DIAST BP 80-89 MM HG: CPT | Performed by: INTERNAL MEDICINE

## 2024-06-17 NOTE — PROGRESS NOTES
Date of Office Visit: 24    Encounter Provider: Juan Odonnell MD  Place of Service: [facility] CARDIOLOGY  Patient Name: Kieran Hdez  :1955    Chief Complaint   Patient presents with    Shortness of Breath    Edema    Follow-up       HPI:   69 y.o. male patient with a history of coronary disease and had PCI to the mid and distal RCA in 2020 after an ST elevation MI.  He also has hyperlipidemia and hypertension as well as continued tobacco abuse.   On 4/3/2020, he presented with severe chest pressure. He was noted to have an inferior posterior STEMI and was subsequently taken to the cardiac catheterization lab. This revealed a normal left main, 30 to 40% proximal LAD, acutely occluded distal RCA, 80% mid RCA, and a small caliber PDA with discrete 95% distal stenosis. He had normal LV function with inferior wall akinesis. He underwent successful PCI of the mid and distal RCA with 4.0 x 18 mm Xience Aisha drug-eluting stent in both segments. This was postdilated with a 4.5 mm noncompliant trek balloon. He was placed on dual antiplatelet therapy with aspirin and Plavix which he will continue for at least 1 year. Post procedure echocardiogram revealed an EF of 40% and no significant valvular abnormalities   Last echocardiogram in  showed normalization of his left ventricular systolic function.  He has continued on Plavix monotherapy along with lisinopril and Toprol.  His blood pressure and heart rate are well controlled.  He is not having any issues with chest pain.  He continues to smoke.  He denies any chest pain.  He states that he feels well.  He has not had recent lab work.    Previous testing and notes have been reviewed by me.   Past Medical History:   Diagnosis Date    Abdominal aortic aneurysm, without rupture, unspecified     2023    CAD (coronary artery disease)     Hyperlipidemia     Hypertension     ESSENTIAL PRIMARY    Ischemic  cardiomyopathy     MI (myocardial infarction) 04/2020    inferior posterior lateral    Tobacco abuse        Past Surgical History:   Procedure Laterality Date    CARDIAC CATHETERIZATION N/A 04/03/2020    Procedure: Left Heart Cath;  Surgeon: Juan Odonnell MD;  Location:  [facility];  Service: Cardiovascular;  Laterality: N/A;    CARDIAC CATHETERIZATION N/A 04/03/2020    Procedure: Stent KEVIN coronary;  Surgeon: Juan Odonnell MD;  Location:  [facility];  Service: Cardiovascular;  Laterality: N/A;    CARDIAC CATHETERIZATION N/A 04/03/2020    Procedure: Coronary angiography;  Surgeon: Juan Odonnell MD;  Location:  [facility];  Service: Cardiovascular;  Laterality: N/A;    CARDIAC CATHETERIZATION N/A 04/03/2020    Procedure: Left ventriculography;  Surgeon: Juan Odonnell MD;  Location:  [facility];  Service: Cardiovascular;  Laterality: N/A;    HERNIA REPAIR      KNEE ACL RECONSTRUCTION      ACL repair    OTHER SURGICAL HISTORY      elbow surgery       Social History     Socioeconomic History    Marital status:    Tobacco Use    Smoking status: Every Day     Current packs/day: 0.50     Types: Cigarettes    Smokeless tobacco: Never    Tobacco comments:     CAFFEINE USE: 4 CUPS COFFEE DAILY   Substance and Sexual Activity    Alcohol use: No    Drug use: No    Sexual activity: Yes     Partners: Female       Family History   Problem Relation Age of Onset    Emphysema Mother     COPD Mother     Heart attack Father     COPD Brother     Emphysema Brother     Heart disease Brother        Review of Systems   Constitutional: Negative for diaphoresis, fever and malaise/fatigue.   HENT:  Negative for nosebleeds and sore throat.    Eyes:  Negative for blurred vision and double vision.   Cardiovascular:  Negative for chest pain, claudication, dyspnea on exertion, irregular heartbeat, leg swelling, near-syncope, orthopnea,  "palpitations, paroxysmal nocturnal dyspnea and syncope.   Respiratory:  Negative for cough, shortness of breath, sleep disturbances due to breathing and snoring.    Endocrine: Negative for cold intolerance, heat intolerance and polydipsia.   Skin:  Negative for itching, poor wound healing and rash.   Musculoskeletal:  Negative for falls, joint pain, joint swelling, muscle weakness and myalgias.   Gastrointestinal:  Negative for abdominal pain, melena, nausea and vomiting.   Neurological:  Negative for dizziness, light-headedness, loss of balance, seizures, vertigo and weakness.   Psychiatric/Behavioral:  Negative for altered mental status, depression and substance abuse.        No Known Allergies      Current Outpatient Medications:     amLODIPine (NORVASC) 10 MG tablet, Take 1 tablet by mouth Daily., Disp: , Rfl:     atorvastatin (LIPITOR) 40 MG tablet, TAKE 1 TABLET BY MOUTH EVERY DAY, Disp: 90 tablet, Rfl: 4    clopidogrel (PLAVIX) 75 MG tablet, TAKE 1 TABLET BY MOUTH EVERY DAY, Disp: 90 tablet, Rfl: 4    lisinopril (PRINIVIL,ZESTRIL) 10 MG tablet, Take 1 tablet by mouth Daily., Disp: , Rfl:     lisinopril (PRINIVIL,ZESTRIL) 20 MG tablet, TAKE 1 TABLET BY MOUTH TWICE DAILY, Disp: 180 tablet, Rfl: 3    metoprolol succinate XL (TOPROL-XL) 25 MG 24 hr tablet, TAKE 1 TABLET BY MOUTH EVERY DAY, Disp: 90 tablet, Rfl: 4    senna-docusate sodium (SENOKOT-S) 8.6-50 MG tablet, Take 2 tablets by mouth Daily., Disp: 60 tablet, Rfl: 5    varenicline (CHANTIX) 0.5 MG tablet, Take 1 tablet by mouth Daily., Disp: , Rfl:     Wheat Dextrin (BENEFIBER DRINK MIX PO), Take  by mouth As Needed., Disp: , Rfl:       Objective:     Vitals:    06/17/24 1241   BP: 130/80   BP Location: Left arm   Patient Position: Sitting   Cuff Size: Adult   Pulse: 68   SpO2: 98%   Weight: 54.6 kg (120 lb 6.4 oz)   Height: 182.9 cm (72\")     Body mass index is 16.33 kg/m².    PHYSICAL EXAM:    Constitutional:       General: Not in acute distress.     " Appearance: Normal appearance. Well-developed.   Eyes:      Pupils: Pupils are equal, round, and reactive to light.   HENT:      Head: Normocephalic.   Neck:      Vascular: No carotid bruit or JVD.   Pulmonary:      Effort: Pulmonary effort is normal. No tachypnea.      Breath sounds: Normal breath sounds. No wheezing. No rales.   Cardiovascular:      Normal rate. Regular rhythm.      No gallop.    Pulses:     Intact distal pulses.   Abdominal:      General: Bowel sounds are normal.      Palpations: Abdomen is soft.      Tenderness: There is no abdominal tenderness.   Musculoskeletal: Normal range of motion.      Cervical back: Normal range of motion and neck supple. No edema. Skin:     General: Skin is warm and dry.   Neurological:      Mental Status: Alert and oriented to person, place, and time.           ECG 12 Lead    Date/Time: 6/17/2024 1:15 PM  Performed by: Juan Odonnell MD    Authorized by: Juan Odonnell MD  Comparison: compared with previous ECG from 6/15/2023  Similar to previous ECG  Rhythm: sinus rhythm  Rate: normal  Conduction: right bundle branch block          8/6/2020  Left ventricular systolic function is normal. Calculated EF = 61.8%. Estimated EF was in agreement with the calculated EF. Estimated EF = 62%. Normal left ventricular cavity size and wall thickness noted. All left ventricular wall segments contract normally. Left ventricular diastolic dysfunction is noted (grade I) consistent with impaired relaxation.  There is moderate calcification of the aortic valve.No aortic valve regurgitation is present. No aortic valve stenosis is present.      4/3/2020  Conclusions:   1. Left main: Normal  2. LAD: 30 to 40% proximal stenosis  3. LCX: Small caliber.  Normal.  4. RCA: Acutely occluded distal segment.  80% mid vessel stenosis.  Small caliber PDA with discrete 95% distal stenosis.  5.  Normal left ventricular size and systolic function.  Inferior wall akinesis  6.  Successful  PCI of the mid and distal RCA with 4.0 x 18 mm Xience Aisha drug-eluting stent in both segments.  Postdilated with a 4.5 mm noncompliant trek balloon      Assessment:     Plan:   69 year-old male with a medical history of coronary disease with ST elevation MI, PCI of the mid and distal RCA, ischemic cardiomyopathy with last ejection fraction of 40%, with tobacco abuse and hypertension, who presents back for follow-up.  He is doing well.  He denies any chest pain.  His blood pressure and heart rate are well controlled.  He has not been following with primary care and has not had recent lab work.     1.  Coronary artery disease.  He is status post STEMI and drug-eluting stent placement to his mid and distal RCA..  No angina currently.  -He remains on Plavix monotherapy.  Continue.  - Continue Lipitor 40 mg p.o. nightly along with lisinopril and metoprolol succinate at current dose.  Blood pressure and heart rate are well controlled.  -Repeat lipid panel and CMP been ordered.    2.  Ischemic cardiomyopathy.  Left ventricular ejection fraction now normalized.  He is on goal-directed medical therapy with Toprol and lisinopril.   -Euvolemic.  No indication for diuretic therapy.     3.  Tobacco abuse.  We discussed the importance of smoking cessation.       4.  Hypertension: At goal.  No change in current regimen.    Labs been ordered and he will get this done in the next few weeks.       Your medication list            Accurate as of June 17, 2024 12:55 PM. If you have any questions, ask your nurse or doctor.                CONTINUE taking these medications        Instructions Last Dose Given Next Dose Due   amLODIPine 10 MG tablet  Commonly known as: NORVASC      Take 1 tablet by mouth Daily.       atorvastatin 40 MG tablet  Commonly known as: LIPITOR      TAKE 1 TABLET BY MOUTH EVERY DAY       BENEFIBER DRINK MIX PO      Take  by mouth As Needed.       clopidogrel 75 MG tablet  Commonly known as: PLAVIX      TAKE 1  TABLET BY MOUTH EVERY DAY       lisinopril 10 MG tablet  Commonly known as: PRINIVIL,ZESTRIL      Take 1 tablet by mouth Daily.       lisinopril 20 MG tablet  Commonly known as: PRINIVIL,ZESTRIL      TAKE 1 TABLET BY MOUTH TWICE DAILY       metoprolol succinate XL 25 MG 24 hr tablet  Commonly known as: TOPROL-XL      TAKE 1 TABLET BY MOUTH EVERY DAY       sennosides-docusate 8.6-50 MG per tablet  Commonly known as: PERICOLACE      Take 2 tablets by mouth Daily.       varenicline 0.5 MG tablet  Commonly known as: CHANTIX      Take 1 tablet by mouth Daily.

## 2024-07-02 RX ORDER — ATORVASTATIN CALCIUM 40 MG/1
TABLET, FILM COATED ORAL
Qty: 90 TABLET | Refills: 4 | Status: SHIPPED | OUTPATIENT
Start: 2024-07-02

## 2024-07-02 RX ORDER — METOPROLOL SUCCINATE 25 MG/1
TABLET, EXTENDED RELEASE ORAL
Qty: 90 TABLET | Refills: 4 | Status: SHIPPED | OUTPATIENT
Start: 2024-07-02

## 2024-09-13 RX ORDER — LISINOPRIL 20 MG/1
20 TABLET ORAL 2 TIMES DAILY
Qty: 180 TABLET | Refills: 3 | Status: SHIPPED | OUTPATIENT
Start: 2024-09-13

## 2024-09-13 NOTE — TELEPHONE ENCOUNTER
Refill Protocol Failed, Requesting Lisinopril 20mg BID    LOV w/ you 6/17/24  FU w/ Cinthya, APRN 6/17/25  Last Labs- 6/17/24    Please review and sign.    JULIO CESAR Landin

## 2025-01-30 ENCOUNTER — HOSPITAL ENCOUNTER (OUTPATIENT)
Facility: HOSPITAL | Age: 70
Discharge: HOME | End: 2025-01-30
Payer: MEDICARE

## 2025-01-30 ENCOUNTER — OFFICE VISIT (OUTPATIENT)
Age: 70
End: 2025-01-30
Payer: MEDICARE

## 2025-01-30 VITALS
TEMPERATURE: 97.5 F | HEIGHT: 72 IN | RESPIRATION RATE: 16 BRPM | DIASTOLIC BLOOD PRESSURE: 80 MMHG | BODY MASS INDEX: 17.26 KG/M2 | WEIGHT: 127.4 LBS | OXYGEN SATURATION: 97 % | SYSTOLIC BLOOD PRESSURE: 150 MMHG | HEART RATE: 60 BPM

## 2025-01-30 VITALS — SYSTOLIC BLOOD PRESSURE: 150 MMHG | DIASTOLIC BLOOD PRESSURE: 80 MMHG

## 2025-01-30 DIAGNOSIS — M79.89 LEG SWELLING: ICD-10-CM

## 2025-01-30 DIAGNOSIS — I71.41 PARARENAL ABDOMINAL AORTIC ANEURYSM (AAA) WITHOUT RUPTURE: ICD-10-CM

## 2025-01-30 DIAGNOSIS — F17.210 CIGARETTE SMOKER: ICD-10-CM

## 2025-01-30 DIAGNOSIS — I72.4 ANEURYSM OF ARTERY OF LOWER EXTREMITY: ICD-10-CM

## 2025-01-30 DIAGNOSIS — I73.9 PERIPHERAL ARTERIAL DISEASE: Primary | ICD-10-CM

## 2025-01-30 LAB
ABDOMINAL DIST AORTA AP: 2.6 CM
ABDOMINAL DIST AORTA TRANS: 2.7 CM
ABDOMINAL DIST AORTA VEL: 43 CM/S
ABDOMINAL LT COM ILIAC AP: 1.3 CM
ABDOMINAL LT COM ILIAC TRANS: 1.2 CM
ABDOMINAL LT COM ILIAC VEL: 78 CM/S
ABDOMINAL LT EXT ILIAC VEL: 269 CM/S
ABDOMINAL MID AORTA AP: 4 CM
ABDOMINAL MID AORTA VEL: 51 CM/S
ABDOMINAL PROX AORTA AP: 2.8 CM
ABDOMINAL PROX AORTA TRANS: 2.7 CM
ABDOMINAL PROX AORTA VEL: 53 CM/S
ABDOMINAL RT COM ILIAC AP: 1.5 CM
ABDOMINAL RT COM ILIAC TRANS: 1.5 CM
ABDOMINAL RT COM ILIAC VEL: 124 CM/S
ABDOMINAL RT EXT ILIAC VEL: 310 CM/S
BH CV LOWER ARTERIAL LEFT ABI RATIO: 0.79
BH CV LOWER ARTERIAL LEFT DORSALIS PEDIS SYS MAX: 110
BH CV LOWER ARTERIAL LEFT POST TIBIAL SYS MAX: 118
BH CV LOWER ARTERIAL RIGHT ABI RATIO: 0.95
BH CV LOWER ARTERIAL RIGHT DORSALIS PEDIS SYS MAX: 134
BH CV LOWER ARTERIAL RIGHT POST TIBIAL SYS MAX: 142
BH CV VAS ABD AO LT EXTERNAL ILIAC AP: 0.9 CM
BH CV VAS ABD AO RT EXTERNAL ILIAC AP: 1 CM
BH CV VAS ABDOMINAL AO RESIDUAL LUMEN AP MEASURE: 3.9 CM
BH CV VAS ABDOMINAL AO RESIDUAL LUMEN TRANS MEASURE: 4 CM
BH CV VAS MID AORTA RATIO: 3.9
BH CV VAS PROX AORTA RATIO: 15
UPPER ARTERIAL LEFT ARM BRACHIAL SYS MAX: NORMAL
UPPER ARTERIAL RIGHT ARM BRACHIAL SYS MAX: NORMAL

## 2025-01-30 PROCEDURE — 93978 VASCULAR STUDY: CPT

## 2025-01-30 PROCEDURE — 93922 UPR/L XTREMITY ART 2 LEVELS: CPT

## 2025-01-30 RX ORDER — OLOPATADINE HYDROCHLORIDE AND MOMETASONE FUROATE 25; 665 UG/1; UG/1
SPRAY, METERED NASAL
COMMUNITY
Start: 2025-01-28

## 2025-01-30 NOTE — PROGRESS NOTES
Chief Complaint  Abdominal aortic aneurysm.    Subjective        Kieran Hdez presents to [facility] VASCULAR SURGERY  HPI   Kieran Hdez is a 69 y.o. male that has been followed in our office for an abdominal aortic aneurysm. He returns today in follow-up along with an aortic duplex.  We also checked ABIs due to complaining of pain with walking at the last visit.  He   reports he  has been doing well without hospitalizations or surgeries. He  denies any worsening abdominal pain, back pain, or pain that radiates to his groin. He denies any rest pain or tissue loss.  He reports when he walks long distances, he gets fatigue in his legs.  He also has been complaining of leg swelling.   Review of Systems   Constitutional:  Negative for fever.   Eyes:  Negative for visual disturbance.   Cardiovascular:  Negative for leg swelling.   Gastrointestinal:  Negative for abdominal pain.   Musculoskeletal:  Negative for back pain.   Skin:  Negative for color change, pallor and wound.   Neurological:  Negative for dizziness, facial asymmetry, speech difficulty and weakness.        Kieran Hdez  reports that he has been smoking cigarettes. He has never used smokeless tobacco..   Tobacco Education/Cessation: Kieran Hdez  reports that he has been smoking cigarettes. He has never used smokeless tobacco. I have educated him on the risk of diseases from using tobacco products such as arterial disease.     He reports that he is thinking about quitting.  He does not have a plan at this point.  He was not interested in speaking making a plan.    I spent 3  minutes counseling the patient.           Objective   Vital Signs:  Vitals:    01/30/25 0855   BP: 150/80   Pulse:    Resp:    Temp:    SpO2:         Body mass index is 17.27 kg/m².   BMI is below normal parameters (malnutrition). Recommendations: Information on healthy weight added to patient's after visit summary       Physical Exam  Vitals reviewed.    Constitutional:       Appearance: Normal appearance.   HENT:      Head: Normocephalic.   Cardiovascular:      Rate and Rhythm: Normal rate and regular rhythm.      Pulses:           Dorsalis pedis pulses are detected w/ Doppler on the right side and detected w/ Doppler on the left side.        Posterior tibial pulses are detected w/ Doppler on the right side and detected w/ Doppler on the left side.   Pulmonary:      Effort: Pulmonary effort is normal.   Musculoskeletal:      Right lower le+ Pitting Edema present.      Left lower le+ Pitting Edema present.   Skin:     General: Skin is warm.   Neurological:      General: No focal deficit present.      Mental Status: He is alert and oriented to person, place, and time.   Psychiatric:         Mood and Affect: Mood normal.          Result Review :      Previous aortic duplex:  4.23 cm.  Right common iliac artery is 1.23 cm and the left is 1.1 cm.    Aortic duplex done today:Duplex Aorta IVC Iliac Graft Complete CAR (2025 08:46)     ABIs:Doppler Ankle Brachial Index Single Level CAR (2025 08:46)     Previous carotid screening: Normal velocities bilaterally.                    Assessment and Plan     Diagnoses and all orders for this visit:    1. Peripheral arterial disease (Primary)  -     Doppler Ankle Brachial Index Single Level CAR; Future    2. Pararenal abdominal aortic aneurysm (AAA) without rupture  -     Duplex Aorta IVC Iliac Graft Complete CAR; Future    3. Cigarette smoker    4. Leg swelling        Patient presents today for follow-up of his abdominal aortic aneurysm.  Today, it is measuring 4 cm.  We discussed indications for surgical repair once this reaches 5 cm.  We also checked ABIs due to history of leg pain with walking.  On the right, his JAYY was 0.95 and the left was 0.79.  We discussed that he has mild to moderate peripheral arterial disease on the left.  We discussed indications for surgical repair.  I have advised a walking  protocol.  He is to continue his Plavix.  We discussed absolute smoking cessation he is also on a statin.  We also discussed his leg swelling, he does have pitting edema.  I have recommended that he speak with his cardiologist about this.  He will return in 6 months along with an aortic duplex and ABIs.       Follow Up     Return in about 6 months (around 7/30/2025) for randy, aortic duplex.  Patient was given instructions and counseling regarding his condition or for health maintenance advice. Please see specific information pulled into the AVS if appropriate.     ANDRÉS Arce

## 2025-03-24 ENCOUNTER — HOSPITAL ENCOUNTER (OUTPATIENT)
Dept: CARDIOLOGY | Facility: HOSPITAL | Age: 70
Discharge: HOME | End: 2025-03-24
Admitting: NURSE PRACTITIONER
Payer: MEDICARE

## 2025-03-24 ENCOUNTER — OFFICE VISIT (OUTPATIENT)
Dept: CARDIOLOGY | Age: 70
End: 2025-03-24
Payer: MEDICARE

## 2025-03-24 VITALS
HEART RATE: 56 BPM | BODY MASS INDEX: 17.61 KG/M2 | OXYGEN SATURATION: 98 % | SYSTOLIC BLOOD PRESSURE: 150 MMHG | WEIGHT: 130 LBS | DIASTOLIC BLOOD PRESSURE: 80 MMHG | HEIGHT: 72 IN

## 2025-03-24 DIAGNOSIS — I50.20 SYSTOLIC CONGESTIVE HEART FAILURE, UNSPECIFIED HF CHRONICITY: Primary | ICD-10-CM

## 2025-03-24 DIAGNOSIS — R79.9 ABNORMAL FINDING OF BLOOD CHEMISTRY, UNSPECIFIED: ICD-10-CM

## 2025-03-24 DIAGNOSIS — F17.210 NICOTINE DEPENDENCE, CIGARETTES, UNCOMPLICATED: ICD-10-CM

## 2025-03-24 DIAGNOSIS — R94.31 ABNORMAL ELECTROCARDIOGRAM (ECG) (EKG): ICD-10-CM

## 2025-03-24 DIAGNOSIS — Z87.891 PERSONAL HISTORY OF TOBACCO USE, PRESENTING HAZARDS TO HEALTH: ICD-10-CM

## 2025-03-24 DIAGNOSIS — I50.20 SYSTOLIC CONGESTIVE HEART FAILURE, UNSPECIFIED HF CHRONICITY: ICD-10-CM

## 2025-03-24 LAB
ALBUMIN SERPL-MCNC: 3.7 G/DL (ref 3.5–5.2)
ALBUMIN/GLOB SERPL: 1.3 G/DL
ALP SERPL-CCNC: 111 U/L (ref 39–117)
ALT SERPL W P-5'-P-CCNC: 6 U/L (ref 1–41)
ANION GAP SERPL CALCULATED.3IONS-SCNC: 11.5 MMOL/L (ref 5–15)
AST SERPL-CCNC: 15 U/L (ref 1–40)
BILIRUB SERPL-MCNC: 0.2 MG/DL (ref 0–1.2)
BUN SERPL-MCNC: 22 MG/DL (ref 8–23)
BUN/CREAT SERPL: 22.9 (ref 7–25)
CALCIUM SPEC-SCNC: 9 MG/DL (ref 8.6–10.5)
CHLORIDE SERPL-SCNC: 104 MMOL/L (ref 98–107)
CHOLEST SERPL-MCNC: 141 MG/DL (ref 0–200)
CO2 SERPL-SCNC: 22.5 MMOL/L (ref 22–29)
CREAT SERPL-MCNC: 0.96 MG/DL (ref 0.76–1.27)
EGFRCR SERPLBLD CKD-EPI 2021: 84.5 ML/MIN/1.73
GLOBULIN UR ELPH-MCNC: 2.8 GM/DL
GLUCOSE SERPL-MCNC: 79 MG/DL (ref 65–99)
HBA1C MFR BLD: 5.5 % (ref 4.8–5.6)
HDLC SERPL-MCNC: 40 MG/DL (ref 40–60)
LDLC SERPL CALC-MCNC: 80 MG/DL (ref 0–100)
LDLC/HDLC SERPL: 1.94 {RATIO}
POTASSIUM SERPL-SCNC: 4.4 MMOL/L (ref 3.5–5.2)
PROT SERPL-MCNC: 6.5 G/DL (ref 6–8.5)
SODIUM SERPL-SCNC: 138 MMOL/L (ref 136–145)
T-UPTAKE NFR SERPL: 1.07 TBI (ref 0.8–1.3)
T4 SERPL-MCNC: 8.1 MCG/DL (ref 4.5–11.7)
TRIGL SERPL-MCNC: 118 MG/DL (ref 0–150)
TSH SERPL DL<=0.05 MIU/L-ACNC: 1.35 UIU/ML (ref 0.27–4.2)
VLDLC SERPL-MCNC: 21 MG/DL (ref 5–40)

## 2025-03-24 PROCEDURE — 84479 ASSAY OF THYROID (T3 OR T4): CPT | Performed by: NURSE PRACTITIONER

## 2025-03-24 PROCEDURE — 36415 COLL VENOUS BLD VENIPUNCTURE: CPT

## 2025-03-24 PROCEDURE — 84443 ASSAY THYROID STIM HORMONE: CPT | Performed by: NURSE PRACTITIONER

## 2025-03-24 PROCEDURE — 84436 ASSAY OF TOTAL THYROXINE: CPT | Performed by: NURSE PRACTITIONER

## 2025-03-24 PROCEDURE — 83036 HEMOGLOBIN GLYCOSYLATED A1C: CPT | Performed by: NURSE PRACTITIONER

## 2025-03-24 PROCEDURE — 3079F DIAST BP 80-89 MM HG: CPT | Performed by: NURSE PRACTITIONER

## 2025-03-24 PROCEDURE — 1159F MED LIST DOCD IN RCRD: CPT | Performed by: NURSE PRACTITIONER

## 2025-03-24 PROCEDURE — 80053 COMPREHEN METABOLIC PANEL: CPT | Performed by: NURSE PRACTITIONER

## 2025-03-24 PROCEDURE — 3077F SYST BP >= 140 MM HG: CPT | Performed by: NURSE PRACTITIONER

## 2025-03-24 PROCEDURE — 99214 OFFICE O/P EST MOD 30 MIN: CPT | Performed by: NURSE PRACTITIONER

## 2025-03-24 PROCEDURE — 1160F RVW MEDS BY RX/DR IN RCRD: CPT | Performed by: NURSE PRACTITIONER

## 2025-03-24 PROCEDURE — 80061 LIPID PANEL: CPT | Performed by: NURSE PRACTITIONER

## 2025-03-24 PROCEDURE — 93000 ELECTROCARDIOGRAM COMPLETE: CPT | Performed by: NURSE PRACTITIONER

## 2025-03-24 PROCEDURE — G0296 VISIT TO DETERM LDCT ELIG: HCPCS | Performed by: NURSE PRACTITIONER

## 2025-03-24 RX ORDER — SPIRONOLACTONE 25 MG/1
25 TABLET ORAL DAILY
Qty: 30 TABLET | Refills: 11 | Status: SHIPPED | OUTPATIENT
Start: 2025-03-24

## 2025-03-24 NOTE — PROGRESS NOTES
Date of Office Visit: 2025  Encounter Provider: ANDRÉS Castellanos  Place of Service: [facility] CARDIOLOGY  Patient Name: Kieran Hdez  :1955    Chief complaint: Abdominal aortic aneurysm, coronary artery disease    HPI: Kieran Hdez is a 69 y.o. male who is a patient of Dr. Odonnell and is new to me today.  He has a history of coronary artery disease in the past with PCI to the mid and distal RCA in 2020 after an ST elevation MI.  He had severe chest pain had come in and was taken to the cardiac Cath Lab.  He had an acutely occluded distal RCA, 80% mid RCA and a small caliber PDA with discrete 95% distal stenosis.  He had some inferior wall hypokinesis and normal LV function.  He had a successful PCI with a drug-eluting stent to both segments with a 4 x 18 mm Xience Aisha drug-eluting stent.  It was postdilated to a 4.5 with a noncompliant trek balloon.  Postprocedure EF was 40%.  His echo normalized.  He has been continued on Plavix monotherapy and medical therapy continues to smoke cigarettes.  He was last in the office in  and we encouraged him to stop smoking and ordered some labs to evaluate his cholesterol.  It does not look like he ever went to get those done.    Previous testing and notes have been reviewed by me.   2020  Left ventricular systolic function is normal. Calculated EF = 61.8%. Estimated EF was in agreement with the calculated EF. Estimated EF = 62%. Normal left ventricular cavity size and wall thickness noted. All left ventricular wall segments contract normally. Left ventricular diastolic dysfunction is noted (grade I) consistent with impaired relaxation.  There is moderate calcification of the aortic valve.No aortic valve regurgitation is present. No aortic valve stenosis is present.        4/3/2020  Conclusions:   1. Left main: Normal  2. LAD: 30 to 40% proximal stenosis  3. LCX: Small caliber.  Normal.  4. RCA: Acutely  occluded distal segment.  80% mid vessel stenosis.  Small caliber PDA with discrete 95% distal stenosis.  5.  Normal left ventricular size and systolic function.  Inferior wall akinesis  6.  Successful PCI of the mid and distal RCA with 4.0 x 18 mm Xience Aisha drug-eluting stent in both segments.  Postdilated with a 4.5 mm noncompliant trek balloon  Past Medical History:   Diagnosis Date   • Abdominal aortic aneurysm, without rupture, unspecified     4-   • CAD (coronary artery disease)    • Hyperlipidemia    • Hypertension     ESSENTIAL PRIMARY   • Ischemic cardiomyopathy    • MI (myocardial infarction) 04/2020    inferior posterior lateral   • Tobacco abuse        Past Surgical History:   Procedure Laterality Date   • CARDIAC CATHETERIZATION N/A 04/03/2020    Procedure: Left Heart Cath;  Surgeon: Juan Odonnell MD;  Location:  [facility];  Service: Cardiovascular;  Laterality: N/A;   • CARDIAC CATHETERIZATION N/A 04/03/2020    Procedure: Stent KEVIN coronary;  Surgeon: Juan Odonnell MD;  Location:  [facility];  Service: Cardiovascular;  Laterality: N/A;   • CARDIAC CATHETERIZATION N/A 04/03/2020    Procedure: Coronary angiography;  Surgeon: Juan Odonnell MD;  Location:  [facility];  Service: Cardiovascular;  Laterality: N/A;   • CARDIAC CATHETERIZATION N/A 04/03/2020    Procedure: Left ventriculography;  Surgeon: Juan Odonnell MD;  Location:  [facility];  Service: Cardiovascular;  Laterality: N/A;   • HERNIA REPAIR     • KNEE ACL RECONSTRUCTION      ACL repair   • OTHER SURGICAL HISTORY      elbow surgery       Social History     Socioeconomic History   • Marital status:    Tobacco Use   • Smoking status: Every Day     Current packs/day: 0.50     Types: Cigarettes   • Smokeless tobacco: Never   • Tobacco comments:     CAFFEINE USE: 4 CUPS COFFEE DAILY   Vaping Use   • Vaping status: Never Used  "  Substance and Sexual Activity   • Alcohol use: No   • Drug use: No   • Sexual activity: Yes     Partners: Female       Family History   Problem Relation Age of Onset   • Emphysema Mother    • COPD Mother    • Heart attack Father    • COPD Brother    • Emphysema Brother    • Heart disease Brother        ROS    No Known Allergies      Current Outpatient Medications:   •  atorvastatin (LIPITOR) 40 MG tablet, TAKE 1 TABLET BY MOUTH EVERY DAY, Disp: 90 tablet, Rfl: 4  •  clopidogrel (PLAVIX) 75 MG tablet, TAKE 1 TABLET BY MOUTH EVERY DAY, Disp: 90 tablet, Rfl: 4  •  lisinopril (PRINIVIL,ZESTRIL) 20 MG tablet, TAKE 1 TABLET BY MOUTH TWICE DAILY, Disp: 180 tablet, Rfl: 3  •  metoprolol succinate XL (TOPROL-XL) 25 MG 24 hr tablet, TAKE 1 TABLET BY MOUTH EVERY DAY, Disp: 90 tablet, Rfl: 4  •  Ryaltris 665-25 MCG/ACT suspension, , Disp: , Rfl:   •  senna-docusate sodium (SENOKOT-S) 8.6-50 MG tablet, Take 2 tablets by mouth Daily., Disp: 60 tablet, Rfl: 5  •  Wheat Dextrin (BENEFIBER DRINK MIX PO), Take  by mouth As Needed., Disp: , Rfl:   •  amLODIPine (NORVASC) 10 MG tablet, Take 1 tablet by mouth Daily. (Patient not taking: Reported on 1/30/2025), Disp: , Rfl:   •  lisinopril (PRINIVIL,ZESTRIL) 10 MG tablet, Take 1 tablet by mouth Daily., Disp: , Rfl:   •  varenicline (CHANTIX) 0.5 MG tablet, Take 1 tablet by mouth Daily. (Patient not taking: Reported on 1/30/2025), Disp: , Rfl:         Objective:     Vitals:    03/24/25 1208   BP: 150/80   BP Location: Right arm   Patient Position: Sitting   Pulse: 56   SpO2: 98%   Weight: 59 kg (130 lb)   Height: 182.9 cm (72.01\")     Body mass index is 17.63 kg/m².    PHYSICAL EXAM:    Physical Exam      ECG 12 Lead    Date/Time: 3/24/2025 12:40 PM  Performed by: Cinthya Shelley APRN    Authorized by: Cinthya Shelley APRN  Comparison: compared with previous ECG from 6/17/2024  Similar to previous ECG  Rhythm: sinus rhythm  Rate: normal  Q waves: V1, V2 and V3    QRS axis: " normal    Clinical impression: abnormal EKG            Assessment/Plan:               Your medication list            Accurate as of March 24, 2025 12:38 PM. If you have any questions, ask your nurse or doctor.                CONTINUE taking these medications        Instructions Last Dose Given Next Dose Due   amLODIPine 10 MG tablet  Commonly known as: NORVASC      Take 1 tablet by mouth Daily.       atorvastatin 40 MG tablet  Commonly known as: LIPITOR      TAKE 1 TABLET BY MOUTH EVERY DAY       BENEFIBER DRINK MIX PO      Take  by mouth As Needed.       clopidogrel 75 MG tablet  Commonly known as: PLAVIX      TAKE 1 TABLET BY MOUTH EVERY DAY       lisinopril 10 MG tablet  Commonly known as: PRINIVIL,ZESTRIL      Take 1 tablet by mouth Daily.       lisinopril 20 MG tablet  Commonly known as: PRINIVIL,ZESTRIL      TAKE 1 TABLET BY MOUTH TWICE DAILY       metoprolol succinate XL 25 MG 24 hr tablet  Commonly known as: TOPROL-XL      TAKE 1 TABLET BY MOUTH EVERY DAY       Ryaltris 665-25 MCG/ACT suspension  Generic drug: Olopatadine-Mometasone           sennosides-docusate 8.6-50 MG per tablet  Commonly known as: PERICOLACE      Take 2 tablets by mouth Daily.       varenicline 0.5 MG tablet  Commonly known as: CHANTIX      Take 1 tablet by mouth Daily.                  As always, it has been a pleasure to participate in your patient's care.      Sincerely,     Cinthya BOWEN

## 2025-03-24 NOTE — PROGRESS NOTES
Date of Office Visit: 2025  Encounter Provider: ANDRÉS Castellanos  Place of Service: [facility] CARDIOLOGY  Patient Name: Kieran Hdez  :1955    Chief complaint: Lower extremity edema    HPI: Kieran Hdez is a 69 y.o. male who is a patient of Dr. Odonnell and is new to me today.  He has a history of coronary artery disease, PCI to the mid and distal RCA in 2020 after an ST elevation MI.  He also has continued tobacco abuse, hypertension and hyperlipidemia.  He had a cardiac cath in 2020 when he came in with a STEMI.  Revealed a normal left main, 3040% proximal LAD, acutely occluded distal RCA, 80% mid RCA and small caliber PDA with a discrete 95% distal stenosis.  He had normal LV function with inferior wall akinesis.  He underwent a successful PCI of the mid and distal RCA with a 4.0 x 18 mm Xience Aisha drug-eluting stent in both segments.  This was postdilated to 4.5 mm noncompliant trek balloon.  He was placed on dual antiplatelet therapy with aspirin and Plavix.  Postprocedure echocardiogram revealed an EF of 40%.  Echo in  showed normalization of his LV function we completed continued him on medical therapy with lisinopril and Toprol and single therapy with Plavix.  He is continue to smoke cigarettes.    He comes in today because he started having some swelling in his legs.  It has been going on for about 2 months.  His blood pressure is also elevated at 150/80.  This is pitting edema he denies that he is had any chest pain or shortness of air.  Previous testing and notes have been reviewed by me.   Past Medical History:   Diagnosis Date    Abdominal aortic aneurysm, without rupture, unspecified     2023    CAD (coronary artery disease)     Hyperlipidemia     Hypertension     ESSENTIAL PRIMARY    Ischemic cardiomyopathy     MI (myocardial infarction) 2020    inferior posterior lateral    Tobacco abuse        Past Surgical History:    Procedure Laterality Date    CARDIAC CATHETERIZATION N/A 04/03/2020    Procedure: Left Heart Cath;  Surgeon: Juan Odonnell MD;  Location:  [facility] C[facility]VASIVE [facility];  Service: Cardiovascular;  Laterality: N/A;    CARDIAC CATHETERIZATION N/A 04/03/2020    Procedure: Stent KEVIN coronary;  Surgeon: Juan Odonnell MD;  Location:  [facility];  Service: Cardiovascular;  Laterality: N/A;    CARDIAC CATHETERIZATION N/A 04/03/2020    Procedure: Coronary angiography;  Surgeon: Juan Odonnell MD;  Location:  [facility];  Service: Cardiovascular;  Laterality: N/A;    CARDIAC CATHETERIZATION N/A 04/03/2020    Procedure: Left ventriculography;  Surgeon: Juan Odonnell MD;  Location:  [facility];  Service: Cardiovascular;  Laterality: N/A;    HERNIA REPAIR      KNEE ACL RECONSTRUCTION      ACL repair    OTHER SURGICAL HISTORY      elbow surgery       Social History     Socioeconomic History    Marital status:    Tobacco Use    Smoking status: Every Day     Current packs/day: 0.50     Types: Cigarettes    Smokeless tobacco: Never    Tobacco comments:     CAFFEINE USE: 4 CUPS COFFEE DAILY   Vaping Use    Vaping status: Never Used   Substance and Sexual Activity    Alcohol use: No    Drug use: No    Sexual activity: Yes     Partners: Female       Family History   Problem Relation Age of Onset    Emphysema Mother     COPD Mother     Heart attack Father     COPD Brother     Emphysema Brother     Heart disease Brother        Review of Systems   Constitutional: Negative for diaphoresis and malaise/fatigue.   Cardiovascular:  Positive for leg swelling. Negative for chest pain, claudication, dyspnea on exertion, irregular heartbeat, near-syncope, orthopnea, palpitations, paroxysmal nocturnal dyspnea and syncope.   Respiratory:  Negative for cough, shortness of breath and sleep disturbances due to breathing.    Musculoskeletal:  Negative for  "falls.   Neurological:  Negative for dizziness and weakness.   Psychiatric/Behavioral:  Negative for altered mental status and substance abuse.        No Known Allergies      Current Outpatient Medications:     atorvastatin (LIPITOR) 40 MG tablet, TAKE 1 TABLET BY MOUTH EVERY DAY, Disp: 90 tablet, Rfl: 4    clopidogrel (PLAVIX) 75 MG tablet, TAKE 1 TABLET BY MOUTH EVERY DAY, Disp: 90 tablet, Rfl: 4    lisinopril (PRINIVIL,ZESTRIL) 20 MG tablet, TAKE 1 TABLET BY MOUTH TWICE DAILY, Disp: 180 tablet, Rfl: 3    metoprolol succinate XL (TOPROL-XL) 25 MG 24 hr tablet, TAKE 1 TABLET BY MOUTH EVERY DAY, Disp: 90 tablet, Rfl: 4    Ryaltris 665-25 MCG/ACT suspension, , Disp: , Rfl:     senna-docusate sodium (SENOKOT-S) 8.6-50 MG tablet, Take 2 tablets by mouth Daily., Disp: 60 tablet, Rfl: 5    Wheat Dextrin (BENEFIBER DRINK MIX PO), Take  by mouth As Needed., Disp: , Rfl:     spironolactone (ALDACTONE) 25 MG tablet, Take 1 tablet by mouth Daily., Disp: 30 tablet, Rfl: 11        Objective:     Vitals:    03/24/25 1208   BP: 150/80   BP Location: Right arm   Patient Position: Sitting   Pulse: 56   SpO2: 98%   Weight: 59 kg (130 lb)   Height: 182.9 cm (72.01\")     Body mass index is 17.63 kg/m².    PHYSICAL EXAM:    Constitutional:       General: Not in acute distress.     Appearance: Normal appearance. Well-developed.   Eyes:      Pupils: Pupils are equal, round, and reactive to light.   HENT:      Head: Normocephalic.   Neck:      Vascular: No carotid bruit or JVD.   Pulmonary:      Effort: Pulmonary effort is normal. No tachypnea.      Breath sounds: Normal breath sounds. No wheezing. No rales.   Cardiovascular:      Normal rate. Regular rhythm.      No gallop.    Pulses:     Intact distal pulses.   Edema:     Peripheral edema present.     Pretibial: bilateral 2+ edema of the pretibial area.     Ankle: 1+ edema of the left ankle and 2+ edema of the right ankle.  Abdominal:      General: Bowel sounds are normal.      " Palpations: Abdomen is soft.      Tenderness: There is no abdominal tenderness.   Musculoskeletal: Normal range of motion.      Cervical back: Normal range of motion and neck supple. No edema. Skin:     General: Skin is warm and dry.   Neurological:      Mental Status: Alert and oriented to person, place, and time.           ECG 12 Lead    Date/Time: 3/24/2025 4:52 PM  Performed by: Cinthya Shelley APRN    Authorized by: Cinthya Shelley APRN  Comparison: compared with previous ECG from 6/17/2024  Similar to previous ECG  Rhythm: sinus rhythm  Rate: normal  Q waves: V1, V2 and V3    QRS axis: normal    Clinical impression: abnormal EKG        Lipid Panel          3/24/2025    12:59   Lipid Panel   Total Cholesterol 141    Triglycerides 118    HDL Cholesterol 40    VLDL Cholesterol 21    LDL Cholesterol  80    LDL/HDL Ratio 1.94          Assessment/Plan:   1.  Coronary artery disease-history of intervention to the right coronary in 2020 on monotherapy with clopidogrel 75 mg daily.  No angina symptoms EKG stable    2.  Peripheral edema.  Will start on some spironolactone given his history of heart failure and hypertension.  I am going to order an echocardiogram to see if there has been any change in his LV function.  Not short of breath would not start Lasix at this time counseled him on dietary modifications.  He is also been wearing some compression socks.    3.  Essential hypertension-continue lisinopril 20 mg twice a day metoprolol XL 25 mg daily will add spironolactone 25 mg daily and check labs in a week.    4.  Dyslipidemia-continue atorvastatin 40 mg daily lipids at goal    5.  Ongoing tobacco abuse.  Counseled patient on the risks of smoking and peripheral vascular disease as well as coronary disease.  He understands not ready to stop smoking at this time.    Kieran Hdez  reports that he has been smoking cigarettes. He has never used smokeless tobacco. I have educated him on the risk of diseases from  using tobacco products such as cancer, COPD, and heart disease.     I advised him to quit and he is not willing to quit.    I spent 5 minutes counseling the patient.         Follow-up in the office in a month I will call with lab results.         Your medication list            Accurate as of March 24, 2025 11:59 PM. If you have any questions, ask your nurse or doctor.                START taking these medications        Instructions Last Dose Given Next Dose Due   spironolactone 25 MG tablet  Commonly known as: ALDACTONE  Started by: Cinthya Shelley      Take 1 tablet by mouth Daily.              CHANGE how you take these medications        Instructions Last Dose Given Next Dose Due   lisinopril 20 MG tablet  Commonly known as: PRINIVIL,ZESTRIL  What changed: Another medication with the same name was removed. Continue taking this medication, and follow the directions you see here.  Changed by: Cinthya Shelley      TAKE 1 TABLET BY MOUTH TWICE DAILY              CONTINUE taking these medications        Instructions Last Dose Given Next Dose Due   atorvastatin 40 MG tablet  Commonly known as: LIPITOR      TAKE 1 TABLET BY MOUTH EVERY DAY       BENEFIBER DRINK MIX PO      Take  by mouth As Needed.       clopidogrel 75 MG tablet  Commonly known as: PLAVIX      TAKE 1 TABLET BY MOUTH EVERY DAY       metoprolol succinate XL 25 MG 24 hr tablet  Commonly known as: TOPROL-XL      TAKE 1 TABLET BY MOUTH EVERY DAY       Ryaltris 665-25 MCG/ACT suspension  Generic drug: Olopatadine-Mometasone           sennosides-docusate 8.6-50 MG per tablet  Commonly known as: PERICOLACE      Take 2 tablets by mouth Daily.              STOP taking these medications      amLODIPine 10 MG tablet  Commonly known as: NORVASC  Stopped by: Cinthya Shelley        varenicline 0.5 MG tablet  Commonly known as: CHANTIX  Stopped by: Cinthya Shelley                  Where to Get Your Medications        These medications were sent to [id number] -  [place], [place] - [facility] [address]  AT [address] - [phone]  - [phone]   [address] , [facility] [address]      Phone: [phone]   spironolactone 25 MG tablet           As always, it has been a pleasure to participate in your patient's care.      Sincerely,     Cinthya BOWEN

## 2025-03-31 ENCOUNTER — TELEPHONE (OUTPATIENT)
Dept: CARDIOLOGY | Age: 70
End: 2025-03-31

## 2025-03-31 DIAGNOSIS — I50.20 SYSTOLIC CONGESTIVE HEART FAILURE, UNSPECIFIED HF CHRONICITY: Primary | ICD-10-CM

## 2025-03-31 NOTE — TELEPHONE ENCOUNTER
Caller: dee dee maravilla    Relationship to patient: Emergency Contact    Best call back number: [phone]    Patient is needing: PT WAS SUPPOSED TO HAVE LABS DRAWN TODAY PER CHARLINE HERRING INSTRUCTIONS LAST OFFICE VISIT, FOR NEW MEDICATION. NO ORDERS ARE IN CHART. PLEASE ADD ORDERS SO PT CAN GET BLOOD DRAWN.

## 2025-04-01 ENCOUNTER — LAB (OUTPATIENT)
Facility: HOSPITAL | Age: 70
End: 2025-04-01
Payer: MEDICARE

## 2025-04-01 DIAGNOSIS — I50.20 SYSTOLIC CONGESTIVE HEART FAILURE, UNSPECIFIED HF CHRONICITY: ICD-10-CM

## 2025-04-01 LAB
ANION GAP SERPL CALCULATED.3IONS-SCNC: 10.7 MMOL/L (ref 5–15)
BUN SERPL-MCNC: 24 MG/DL (ref 8–23)
BUN/CREAT SERPL: 23.8 (ref 7–25)
CALCIUM SPEC-SCNC: 9.4 MG/DL (ref 8.6–10.5)
CHLORIDE SERPL-SCNC: 106 MMOL/L (ref 98–107)
CO2 SERPL-SCNC: 23.3 MMOL/L (ref 22–29)
CREAT SERPL-MCNC: 1.01 MG/DL (ref 0.76–1.27)
EGFRCR SERPLBLD CKD-EPI 2021: 79.5 ML/MIN/1.73
GLUCOSE SERPL-MCNC: 88 MG/DL (ref 65–99)
POTASSIUM SERPL-SCNC: 4.9 MMOL/L (ref 3.5–5.2)
SODIUM SERPL-SCNC: 140 MMOL/L (ref 136–145)
WHOLE BLOOD HOLD SPECIMEN: NORMAL

## 2025-04-01 PROCEDURE — 36415 COLL VENOUS BLD VENIPUNCTURE: CPT

## 2025-04-01 PROCEDURE — 80048 BASIC METABOLIC PNL TOTAL CA: CPT

## 2025-04-01 NOTE — TELEPHONE ENCOUNTER
Notified Cassandra that the lab orders for Kieran Hdez have been placed per ANDRÉS Mendes.  She verbalized understanding.    Thank you,  Chandrika JACKSON RN  Triage Nurse LINSEY  04/01/25 08:09 EDT

## 2025-04-04 ENCOUNTER — HOSPITAL ENCOUNTER (OUTPATIENT)
Dept: CARDIOLOGY | Facility: HOSPITAL | Age: 70
Discharge: HOME | End: 2025-04-04
Payer: MEDICARE

## 2025-04-04 VITALS
DIASTOLIC BLOOD PRESSURE: 83 MMHG | BODY MASS INDEX: 17.61 KG/M2 | SYSTOLIC BLOOD PRESSURE: 164 MMHG | HEART RATE: 62 BPM | HEIGHT: 72 IN | OXYGEN SATURATION: 94 % | WEIGHT: 130 LBS

## 2025-04-04 DIAGNOSIS — R94.31 ABNORMAL ELECTROCARDIOGRAM (ECG) (EKG): ICD-10-CM

## 2025-04-04 DIAGNOSIS — I50.20 SYSTOLIC CONGESTIVE HEART FAILURE, UNSPECIFIED HF CHRONICITY: ICD-10-CM

## 2025-04-04 LAB
AORTIC ARCH: 1.8 CM
AORTIC DIMENSIONLESS INDEX: 0.56 (DI)
ASCENDING AORTA: 2.8 CM
AV MEAN PRESS GRAD SYS DOP V1V2: 3.2 MMHG
AV VMAX SYS DOP: 118 CM/SEC
BH CV ECHO MEAS - ACS: 1.87 CM
BH CV ECHO MEAS - AI P1/2T: 540 MSEC
BH CV ECHO MEAS - AO MAX PG: 5.6 MMHG
BH CV ECHO MEAS - AO ROOT AREA (BSA CORRECTED): 1.9 CM2
BH CV ECHO MEAS - AO ROOT DIAM: 3.4 CM
BH CV ECHO MEAS - AO V2 VTI: 25.7 CM
BH CV ECHO MEAS - AVA(I,D): 1.72 CM2
BH CV ECHO MEAS - EDV(CUBED): 91.1 ML
BH CV ECHO MEAS - EDV(MOD-SP2): 55 ML
BH CV ECHO MEAS - EDV(MOD-SP4): 45 ML
BH CV ECHO MEAS - EF(MOD-SP2): 67.3 %
BH CV ECHO MEAS - EF(MOD-SP4): 68.9 %
BH CV ECHO MEAS - ESV(CUBED): 16 ML
BH CV ECHO MEAS - ESV(MOD-SP2): 18 ML
BH CV ECHO MEAS - ESV(MOD-SP4): 14 ML
BH CV ECHO MEAS - FS: 43.9 %
BH CV ECHO MEAS - IVS/LVPW: 0.88 CM
BH CV ECHO MEAS - IVSD: 0.7 CM
BH CV ECHO MEAS - LAT PEAK E' VEL: 9.6 CM/SEC
BH CV ECHO MEAS - LV DIASTOLIC VOL/BSA (35-75): 25.4 CM2
BH CV ECHO MEAS - LV MASS(C)D: 104.5 GRAMS
BH CV ECHO MEAS - LV MAX PG: 2 MMHG
BH CV ECHO MEAS - LV MEAN PG: 0.98 MMHG
BH CV ECHO MEAS - LV SYSTOLIC VOL/BSA (12-30): 7.9 CM2
BH CV ECHO MEAS - LV V1 MAX: 70.7 CM/SEC
BH CV ECHO MEAS - LV V1 VTI: 14.4 CM
BH CV ECHO MEAS - LVIDD: 4.5 CM
BH CV ECHO MEAS - LVIDS: 2.5 CM
BH CV ECHO MEAS - LVOT AREA: 3.1 CM2
BH CV ECHO MEAS - LVOT DIAM: 1.98 CM
BH CV ECHO MEAS - LVPWD: 0.8 CM
BH CV ECHO MEAS - MED PEAK E' VEL: 7.5 CM/SEC
BH CV ECHO MEAS - MV A DUR: 0.11 SEC
BH CV ECHO MEAS - MV A MAX VEL: 57.6 CM/SEC
BH CV ECHO MEAS - MV DEC SLOPE: 430.3 CM/SEC2
BH CV ECHO MEAS - MV DEC TIME: 0.16 SEC
BH CV ECHO MEAS - MV E MAX VEL: 45.1 CM/SEC
BH CV ECHO MEAS - MV E/A: 0.78
BH CV ECHO MEAS - MV MAX PG: 1.42 MMHG
BH CV ECHO MEAS - MV MEAN PG: 0.6 MMHG
BH CV ECHO MEAS - MV P1/2T: 38 MSEC
BH CV ECHO MEAS - MV V2 VTI: 22.6 CM
BH CV ECHO MEAS - MVA(P1/2T): 5.8 CM2
BH CV ECHO MEAS - MVA(VTI): 1.96 CM2
BH CV ECHO MEAS - PA ACC TIME: 0.11 SEC
BH CV ECHO MEAS - PA V2 MAX: 90.5 CM/SEC
BH CV ECHO MEAS - PULM A REVS DUR: 0.1 SEC
BH CV ECHO MEAS - PULM A REVS VEL: 41.4 CM/SEC
BH CV ECHO MEAS - PULM DIAS VEL: 36.7 CM/SEC
BH CV ECHO MEAS - PULM S/D: 0.83
BH CV ECHO MEAS - PULM SYS VEL: 30.4 CM/SEC
BH CV ECHO MEAS - QP/QS: 0.96
BH CV ECHO MEAS - RAP SYSTOLE: 3 MMHG
BH CV ECHO MEAS - RV MAX PG: 2.09 MMHG
BH CV ECHO MEAS - RV V1 MAX: 72.3 CM/SEC
BH CV ECHO MEAS - RV V1 VTI: 12.7 CM
BH CV ECHO MEAS - RVOT DIAM: 2.06 CM
BH CV ECHO MEAS - RVSP: 30.1 MMHG
BH CV ECHO MEAS - SUP REN AO DIAM: 1.6 CM
BH CV ECHO MEAS - SV(LVOT): 44.2 ML
BH CV ECHO MEAS - SV(MOD-SP2): 37 ML
BH CV ECHO MEAS - SV(MOD-SP4): 31 ML
BH CV ECHO MEAS - SV(RVOT): 42.4 ML
BH CV ECHO MEAS - SVI(LVOT): 24.9 ML/M2
BH CV ECHO MEAS - SVI(MOD-SP2): 20.9 ML/M2
BH CV ECHO MEAS - SVI(MOD-SP4): 17.5 ML/M2
BH CV ECHO MEAS - TAPSE (>1.6): 1.76 CM
BH CV ECHO MEAS - TR MAX PG: 27.1 MMHG
BH CV ECHO MEAS - TR MAX VEL: 260.1 CM/SEC
BH CV ECHO MEASUREMENTS AVERAGE E/E' RATIO: 5.27
BH CV XLRA - RV BASE: 3.2 CM
BH CV XLRA - RV LENGTH: 7.6 CM
BH CV XLRA - RV MID: 2.6 CM
BH CV XLRA - TDI S': 9.7 CM/SEC
LEFT ATRIUM VOLUME INDEX: 13.2 ML/M2
LV EF BIPLANE MOD: 69.1 %
SINUS: 2.9 CM
STJ: 2.8 CM

## 2025-04-04 PROCEDURE — 93306 TTE W/DOPPLER COMPLETE: CPT

## 2025-04-07 ENCOUNTER — RESULTS FOLLOW-UP (OUTPATIENT)
Dept: CARDIOLOGY | Age: 70
End: 2025-04-07
Payer: MEDICARE

## 2025-04-07 NOTE — TELEPHONE ENCOUNTER
Discussed results with patient. He has noted some improvement in swelling with starting spironolactone. Will continue. Recommend that he try to wear the compression socks. His wife plans on trying to get him to wear the socks.

## 2025-05-06 ENCOUNTER — OFFICE VISIT (OUTPATIENT)
Dept: CARDIOLOGY | Age: 70
End: 2025-05-06
Payer: MEDICARE

## 2025-05-06 VITALS
HEART RATE: 85 BPM | WEIGHT: 125 LBS | BODY MASS INDEX: 16.93 KG/M2 | DIASTOLIC BLOOD PRESSURE: 80 MMHG | SYSTOLIC BLOOD PRESSURE: 126 MMHG | HEIGHT: 72 IN

## 2025-05-06 DIAGNOSIS — I50.20 SYSTOLIC CONGESTIVE HEART FAILURE, UNSPECIFIED HF CHRONICITY: Primary | ICD-10-CM

## 2025-05-06 PROCEDURE — 1160F RVW MEDS BY RX/DR IN RCRD: CPT | Performed by: NURSE PRACTITIONER

## 2025-05-06 PROCEDURE — 3079F DIAST BP 80-89 MM HG: CPT | Performed by: NURSE PRACTITIONER

## 2025-05-06 PROCEDURE — 99214 OFFICE O/P EST MOD 30 MIN: CPT | Performed by: NURSE PRACTITIONER

## 2025-05-06 PROCEDURE — 1159F MED LIST DOCD IN RCRD: CPT | Performed by: NURSE PRACTITIONER

## 2025-05-06 PROCEDURE — 3074F SYST BP LT 130 MM HG: CPT | Performed by: NURSE PRACTITIONER

## 2025-05-06 NOTE — PROGRESS NOTES
Date of Office Visit: 2025  Encounter Provider: ANDRÉS Castellanos  Place of Service: [facility] CARDIOLOGY  Patient Name: Kieran Hdez  :1955    Chief complaint: lower extremity edema    HPI:Kieran Hdez is a 69 y.o. male who is a patient of Dr. Odonnell and is known to me from last week.  He has a history of coronary artery disease, PCI to the mid and distal RCA in 2020 after an ST elevation MI.  He also has continued tobacco abuse, hypertension and hyperlipidemia.  He had a cardiac cath in 2020 when he came in with a STEMI.  Revealed a normal left main, 3040% proximal LAD, acutely occluded distal RCA, 80% mid RCA and small caliber PDA with a discrete 95% distal stenosis.  He had normal LV function with inferior wall akinesis.  He underwent a successful PCI of the mid and distal RCA with a 4.0 x 18 mm Xience Aisha drug-eluting stent in both segments.  This was postdilated to 4.5 mm noncompliant trek balloon.  He was placed on dual antiplatelet therapy with aspirin and Plavix.  Postprocedure echocardiogram revealed an EF of 40%.  Echo in  showed normalization of his LV function we completed continued him on medical therapy with lisinopril and Toprol and single therapy with Plavix.  He is continue to smoke cigarettes.     I saw him in the office last month he was having some swelling in his legs for the last 2 months and his blood pressure was elevated.  I added some spironolactone given his heart failure and hypertension and ordered an echocardiogram.  We talked about compression socks and dietary modifications.  He had an echocardiogram he had mild to moderate aortic insufficiency and his ejection fraction was 69% with grade 1 impaired relaxation.  His follow-up labs were stable.  He comes in today edema has for the most part resolved he is not short of breath and blood pressure is better controlled.    Previous testing and notes have been  reviewed by me.     April 4, 2025 echocardiogram    Left ventricular systolic function is normal. Calculated left ventricular EF = 69.1%    Left ventricular diastolic function is consistent with (grade I) impaired relaxation.    Mild-moderate aortic regurgitation.    Estimated right ventricular systolic pressure from tricuspid regurgitation is normal (<35 mmHg).    Normal biatrial and IVC size.      Latest Reference Range & Units Most Recent   Total Cholesterol 0 - 200 mg/dL 141  3/24/25 12:59   HDL Cholesterol 40 - 60 mg/dL 40  3/24/25 12:59   LDL Cholesterol  0 - 100 mg/dL 80  3/24/25 12:59   VLDL Cholesterol 5 - 40 mg/dL 21  3/24/25 12:59   Triglycerides 0 - 150 mg/dL 118  3/24/25 12:59   LDL/HDL Ratio  1.94  3/24/25 12:59   VLDL Cholesterol Gilles 5 - 40 mg/dL 21  10/21/21 12:09        Latest Reference Range & Units Most Recent   Sodium 136 - 145 mmol/L 140  4/1/25 12:51   Potassium 3.5 - 5.2 mmol/L 4.9  4/1/25 12:51   Chloride 98 - 107 mmol/L 106  4/1/25 12:51   CO2 22.0 - 29.0 mmol/L 23.3  4/1/25 12:51   Anion Gap 5.0 - 15.0 mmol/L 10.7  4/1/25 12:51   BUN 8 - 23 mg/dL 24 (H)  4/1/25 12:51   Creatinine 0.76 - 1.27 mg/dL 1.01  4/1/25 12:51   BUN/Creatinine Ratio 7.0 - 25.0  23.8  4/1/25 12:51   eGFR >60.0 mL/min/1.73 79.5  4/1/25 12:51   Glucose 65 - 99 mg/dL 88  4/1/25 12:51   Calcium 8.6 - 10.5 mg/dL 9.4  4/1/25 12:51   Magnesium 1.6 - 2.4 mg/dL 1.9  4/3/20 04:01   Alkaline Phosphatase 39 - 117 U/L 111  3/24/25 12:59   Total Protein 6.0 - 8.5 g/dL 6.5  3/24/25 12:59   Albumin 3.5 - 5.2 g/dL 3.7  3/24/25 12:59   Globulin gm/dL 2.8  3/24/25 12:59   A/G Ratio g/dL 1.3  3/24/25 12:59   AST (SGOT) 1 - 40 U/L 15  3/24/25 12:59   ALT (SGPT) 1 - 41 U/L 6  3/24/25 12:59   Total Bilirubin 0.0 - 1.2 mg/dL 0.2  3/24/25 12:59   (H): Data is abnormally high  Past Medical History:   Diagnosis Date    Abdominal aortic aneurysm, without rupture, unspecified     4-    CAD (coronary artery disease)     Hyperlipidemia      Hypertension     ESSENTIAL PRIMARY    Ischemic cardiomyopathy     MI (myocardial infarction) 04/2020    inferior posterior lateral    Tobacco abuse        Past Surgical History:   Procedure Laterality Date    CARDIAC CATHETERIZATION N/A 04/03/2020    Procedure: Left Heart Cath;  Surgeon: Juan Odonnell MD;  Location:  [facility];  Service: Cardiovascular;  Laterality: N/A;    CARDIAC CATHETERIZATION N/A 04/03/2020    Procedure: Stent KEVIN coronary;  Surgeon: Juan Odonnell MD;  Location:  [facility];  Service: Cardiovascular;  Laterality: N/A;    CARDIAC CATHETERIZATION N/A 04/03/2020    Procedure: Coronary angiography;  Surgeon: Juan Odonnell MD;  Location:  [facility];  Service: Cardiovascular;  Laterality: N/A;    CARDIAC CATHETERIZATION N/A 04/03/2020    Procedure: Left ventriculography;  Surgeon: Juan Odonnell MD;  Location:  [facility];  Service: Cardiovascular;  Laterality: N/A;    HERNIA REPAIR      KNEE ACL RECONSTRUCTION      ACL repair    OTHER SURGICAL HISTORY      elbow surgery       Social History     Socioeconomic History    Marital status:    Tobacco Use    Smoking status: Every Day     Current packs/day: 0.50     Types: Cigarettes    Smokeless tobacco: Never    Tobacco comments:     CAFFEINE USE: 4 CUPS COFFEE DAILY   Vaping Use    Vaping status: Never Used   Substance and Sexual Activity    Alcohol use: No    Drug use: No    Sexual activity: Yes     Partners: Female       Family History   Problem Relation Age of Onset    Emphysema Mother     COPD Mother     Heart attack Father     COPD Brother     Emphysema Brother     Heart disease Brother        Review of Systems   Constitutional: Negative for diaphoresis and malaise/fatigue.   Cardiovascular:  Negative for chest pain, claudication, dyspnea on exertion, irregular heartbeat, leg swelling, near-syncope, orthopnea, palpitations, paroxysmal  "nocturnal dyspnea and syncope.   Respiratory:  Negative for cough, shortness of breath and sleep disturbances due to breathing.    Musculoskeletal:  Negative for falls.   Neurological:  Negative for dizziness and weakness.   Psychiatric/Behavioral:  Negative for altered mental status and substance abuse.        No Known Allergies      Current Outpatient Medications:     atorvastatin (LIPITOR) 40 MG tablet, TAKE 1 TABLET BY MOUTH EVERY DAY, Disp: 90 tablet, Rfl: 4    clopidogrel (PLAVIX) 75 MG tablet, TAKE 1 TABLET BY MOUTH EVERY DAY, Disp: 90 tablet, Rfl: 4    lisinopril (PRINIVIL,ZESTRIL) 20 MG tablet, TAKE 1 TABLET BY MOUTH TWICE DAILY, Disp: 180 tablet, Rfl: 3    metoprolol succinate XL (TOPROL-XL) 25 MG 24 hr tablet, TAKE 1 TABLET BY MOUTH EVERY DAY, Disp: 90 tablet, Rfl: 4    Ryaltris 665-25 MCG/ACT suspension, , Disp: , Rfl:     senna-docusate sodium (SENOKOT-S) 8.6-50 MG tablet, Take 2 tablets by mouth Daily., Disp: 60 tablet, Rfl: 5    spironolactone (ALDACTONE) 25 MG tablet, Take 1 tablet by mouth Daily., Disp: 30 tablet, Rfl: 11    Wheat Dextrin (BENEFIBER DRINK MIX PO), Take  by mouth As Needed., Disp: , Rfl:         Objective:     Vitals:    05/06/25 1143   BP: 126/80   Pulse: 85   Weight: 56.7 kg (125 lb)   Height: 182.9 cm (72\")     Body mass index is 16.95 kg/m².    PHYSICAL EXAM:    Constitutional:       General: Not in acute distress.     Appearance: Normal appearance. Well-developed.   Eyes:      Pupils: Pupils are equal, round, and reactive to light.   HENT:      Head: Normocephalic.   Neck:      Vascular: No carotid bruit or JVD.   Pulmonary:      Effort: Pulmonary effort is normal. No tachypnea.      Breath sounds: Normal breath sounds. No wheezing. No rales.   Cardiovascular:      Normal rate. Regular rhythm.      No gallop.    Pulses:     Intact distal pulses.   Edema:     Peripheral edema absent.   Abdominal:      General: Bowel sounds are normal.      Palpations: Abdomen is soft.      " Tenderness: There is no abdominal tenderness.   Musculoskeletal: Normal range of motion.      Cervical back: Normal range of motion and neck supple. No edema. Skin:     General: Skin is warm and dry.   Neurological:      Mental Status: Alert and oriented to person, place, and time.           Lipid Panel          3/24/2025    12:59   Lipid Panel   Total Cholesterol 141    Triglycerides 118    HDL Cholesterol 40    VLDL Cholesterol 21    LDL Cholesterol  80    LDL/HDL Ratio 1.94          Assessment/Plan:      1.  Coronary artery disease-history of intervention to the right coronary in 2020 on monotherapy with clopidogrel 75 mg daily.  No angina symptoms EKG stable     2.  Peripheral edema.  Improved with the addition of spironolactone.  Echocardiogram was unremarkable.  Labs are stable continue to monitor sodium and wear compression socks.     3.  Essential hypertension-continue lisinopril 20 mg twice a day metoprolol XL 25 mg daily and spironolactone 25 mg daily     4.  Dyslipidemia-continue atorvastatin 40 mg daily lipids at goal     5.  Ongoing tobacco abuse.  Counseled patient on the risks of smoking and peripheral vascular disease as well as coronary disease.  He understands not ready to stop smoking at this time.        Kieran Hdez  reports that he has been smoking cigarettes. He has never used smokeless tobacco. I have educated him on the risk of diseases from using tobacco products such as cancer, COPD, and heart disease.     I advised him to quit and he is not willing to quit.    I spent 5 minutes counseling the patient.            Your medication list            Accurate as of May 6, 2025 12:02 PM. If you have any questions, ask your nurse or doctor.                CONTINUE taking these medications        Instructions Last Dose Given Next Dose Due   atorvastatin 40 MG tablet  Commonly known as: LIPITOR      TAKE 1 TABLET BY MOUTH EVERY DAY       BENEFIBER DRINK MIX PO      Take  by mouth As Needed.        clopidogrel 75 MG tablet  Commonly known as: PLAVIX      TAKE 1 TABLET BY MOUTH EVERY DAY       lisinopril 20 MG tablet  Commonly known as: PRINIVIL,ZESTRIL      TAKE 1 TABLET BY MOUTH TWICE DAILY       metoprolol succinate XL 25 MG 24 hr tablet  Commonly known as: TOPROL-XL      TAKE 1 TABLET BY MOUTH EVERY DAY       Ryaltris 665-25 MCG/ACT suspension  Generic drug: Olopatadine-Mometasone           sennosides-docusate 8.6-50 MG per tablet  Commonly known as: PERICOLACE      Take 2 tablets by mouth Daily.       spironolactone 25 MG tablet  Commonly known as: ALDACTONE      Take 1 tablet by mouth Daily.                  As always, it has been a pleasure to participate in your patient's care.      Sincerely,     Cinthya BOWEN

## 2025-06-17 ENCOUNTER — OFFICE VISIT (OUTPATIENT)
Age: 70
End: 2025-06-17
Payer: MEDICARE

## 2025-06-17 VITALS
SYSTOLIC BLOOD PRESSURE: 118 MMHG | WEIGHT: 121 LBS | HEART RATE: 49 BPM | HEIGHT: 72 IN | BODY MASS INDEX: 16.39 KG/M2 | DIASTOLIC BLOOD PRESSURE: 80 MMHG | OXYGEN SATURATION: 100 %

## 2025-06-17 DIAGNOSIS — F17.210 NICOTINE DEPENDENCE, CIGARETTES, UNCOMPLICATED: ICD-10-CM

## 2025-06-17 DIAGNOSIS — Z71.6 TOBACCO ABUSE COUNSELING: ICD-10-CM

## 2025-06-17 DIAGNOSIS — Z87.891 PERSONAL HISTORY OF TOBACCO USE, PRESENTING HAZARDS TO HEALTH: Primary | ICD-10-CM

## 2025-06-17 RX ORDER — SPIRONOLACTONE 25 MG/1
12.5 TABLET ORAL DAILY
Qty: 15 TABLET | Refills: 11 | Status: SHIPPED | OUTPATIENT
Start: 2025-06-17

## 2025-06-17 NOTE — PROGRESS NOTES
" Low-Dose Lung Cancer CT Screening Visit    CHIEF COMPLAINT:    Shared Decision Making  I am discussing tobacco cessation today with Kieran Hdez    SMOKING HISTORY:     Social History     Tobacco Use   Smoking Status Every Day   • Current packs/day: 0.50   • Types: Cigarettes   Smokeless Tobacco Never   Tobacco Comments    CAFFEINE USE: 4 CUPS COFFEE DAILY       SUBJECTIVE:     Kieran Hdez is currently smoking {0-42:73605} pack(s) per day with a  *** pack year history.  Reports no use of alternate forms of tobacco, electronic cigarettes, marijuana or other substances.  Based on the recommendation of the [place] Preventive Services Task Force, this patient is at high risk for lung cancer and a low-dose CT screening scan is recommended.     The patient has had no hemoptysis, unintentional weight loss or increasing shortness of breath. The patient is asymptomatic and has no signs or symptoms of lung cancer.     Together we discussed the potential benefits and potential harms of being screened for lung cancer including the potential for follow up diagnostic testing, risk for over diagnosis, false positive rate and radiation exposure using the [facility] Lung Cancer Screening Shared Decision-Making Tool. A copy of this decision aid resource has been provided in the after visit summary.  We also reviewed the patient's smoking history and counseled him on the importance and health benefits of stopping the use of tobacco products.      OBJECTIVE:    /80   Pulse (!) 49   Ht 182.9 cm (72\")   Wt 54.9 kg (121 lb)   SpO2 100%   BMI 16.41 kg/m²   General: no distress, alert and oriented  Chest: Lung sounds are clear to auscultation, no wheezes, rales or rhonchi, with symmetric air entry. No respiratory distress  Cardiovascular: RRR with no murmur auscultated      Smoking Cessation discussion:     We discussed that there are a number of resources and interventions to assist with smoking cessation if " needed in the future.   On a scale of zero to ten, the patient rates their motivation to quit at a *** out of 10 today.  Referral to stop smoking class has been offered. Medication options for tobacco cessation have been discussed with the patient.     Recommendations for continued lung cancer screening:      We discussed the NCCN guidelines for lung cancer screening and the patient verbalized understanding that annual screening is recommended until fifteen years beyond smoking as long as they have no other disease or comorbidity that would prevent them from receiving cancer treatments such as surgery should a lung cancer be detected.  After review of the NCCN guidelines and recommendations for ongoing screening, the patient verbalized understanding of recommendations for follow-up.  The patient {[id number]:[id number]} decided to proceed with a Low Dose Lung Cancer Screening CT today.      {0-42:25509} minutes face-to-face spent counseling patient on the continued health benefits of having quit tobacco, maintaining smoking abstinence, smoking cessation strategies and resources, including the 1-800-Quit-Now referenced in the AVS, as well as the importance of adherence to annual lung cancer low-dose CT screening.

## 2025-06-17 NOTE — PROGRESS NOTES
Date of Office Visit: 2025  Encounter Provider: ANDRÉS Castellanos  Place of Service: [facility] CARDIOLOGY  Patient Name: Kieran Hdez  :1955    Chief complaint: Hypertension and coronary disease    HPI:Kieran Hdez is a 69 y.o. male who is a patient of Dr. Odonnell and is known to me from last week.  He has a history of coronary artery disease, PCI to the mid and distal RCA in 2020 after an ST elevation MI.  He also has continued tobacco abuse, hypertension and hyperlipidemia.  He had a cardiac cath in 2020 when he came in with a STEMI.  Revealed a normal left main, 3040% proximal LAD, acutely occluded distal RCA, 80% mid RCA and small caliber PDA with a discrete 95% distal stenosis.  He had normal LV function with inferior wall akinesis.  He underwent a successful PCI of the mid and distal RCA with a 4.0 x 18 mm Xience Aisha drug-eluting stent in both segments.  This was postdilated to 4.5 mm noncompliant trek balloon.  He was placed on dual antiplatelet therapy with aspirin and Plavix.  Postprocedure echocardiogram revealed an EF of 40%.  Echo in  showed normalization of his LV function we completed continued him on medical therapy with lisinopril and Toprol and single therapy with Plavix.  He is continue to smoke cigarettes.     I saw him in the office in April he was having some swelling and elevation in blood pressure.  I added spironolactone.  We did an echocardiogram showed mild to moderate aortic insufficiency EF was normal grade 1 diastolic dysfunction.  His labs were stable.  He followed up with me in May swelling for the most part resolved blood pressure was well-controlled.  He comes in today for follow-up this was scheduled as yearly.  He has no complaints he has had some weight loss and with his continuing to smoke I am concerned.    Previous testing and notes have been reviewed by me.   Past Medical History:   Diagnosis Date     Abdominal aortic aneurysm, without rupture, unspecified     4-    CAD (coronary artery disease)     Hyperlipidemia     Hypertension     ESSENTIAL PRIMARY    Ischemic cardiomyopathy     MI (myocardial infarction) 04/2020    inferior posterior lateral    Tobacco abuse        Past Surgical History:   Procedure Laterality Date    CARDIAC CATHETERIZATION N/A 04/03/2020    Procedure: Left Heart Cath;  Surgeon: Juan Odonnell MD;  Location:  [facility];  Service: Cardiovascular;  Laterality: N/A;    CARDIAC CATHETERIZATION N/A 04/03/2020    Procedure: Stent KEVIN coronary;  Surgeon: Jaun Odonnell MD;  Location:  [facility];  Service: Cardiovascular;  Laterality: N/A;    CARDIAC CATHETERIZATION N/A 04/03/2020    Procedure: Coronary angiography;  Surgeon: Juan Odonnell MD;  Location:  [facility];  Service: Cardiovascular;  Laterality: N/A;    CARDIAC CATHETERIZATION N/A 04/03/2020    Procedure: Left ventriculography;  Surgeon: Juan Odonnell MD;  Location:  [facility];  Service: Cardiovascular;  Laterality: N/A;    HERNIA REPAIR      KNEE ACL RECONSTRUCTION      ACL repair    OTHER SURGICAL HISTORY      elbow surgery       Social History     Socioeconomic History    Marital status:    Tobacco Use    Smoking status: Every Day     Current packs/day: 0.50     Types: Cigarettes    Smokeless tobacco: Never    Tobacco comments:     CAFFEINE USE: 4 CUPS COFFEE DAILY   Vaping Use    Vaping status: Never Used   Substance and Sexual Activity    Alcohol use: No    Drug use: No    Sexual activity: Yes     Partners: Female       Family History   Problem Relation Age of Onset    Emphysema Mother     COPD Mother     Heart attack Father     COPD Brother     Emphysema Brother     Heart disease Brother        Review of Systems   Constitutional: Negative for diaphoresis and malaise/fatigue.   Cardiovascular:  Negative for chest  pain, claudication, dyspnea on exertion, irregular heartbeat, leg swelling, near-syncope, orthopnea, palpitations, paroxysmal nocturnal dyspnea and syncope.   Respiratory:  Negative for cough, shortness of breath and sleep disturbances due to breathing.    Musculoskeletal:  Negative for falls.   Neurological:  Negative for dizziness and weakness.   Psychiatric/Behavioral:  Negative for altered mental status and substance abuse.        No Known Allergies      Current Outpatient Medications:     atorvastatin (LIPITOR) 40 MG tablet, TAKE 1 TABLET BY MOUTH EVERY DAY, Disp: 90 tablet, Rfl: 4    clopidogrel (PLAVIX) 75 MG tablet, TAKE 1 TABLET BY MOUTH EVERY DAY, Disp: 90 tablet, Rfl: 4    lisinopril (PRINIVIL,ZESTRIL) 20 MG tablet, TAKE 1 TABLET BY MOUTH TWICE DAILY, Disp: 180 tablet, Rfl: 3    metoprolol succinate XL (TOPROL-XL) 25 MG 24 hr tablet, TAKE 1 TABLET BY MOUTH EVERY DAY, Disp: 90 tablet, Rfl: 4    Ryaltris 665-25 MCG/ACT suspension, , Disp: , Rfl:     senna-docusate sodium (SENOKOT-S) 8.6-50 MG tablet, Take 2 tablets by mouth Daily., Disp: 60 tablet, Rfl: 5    spironolactone (ALDACTONE) 25 MG tablet, Take 1 tablet by mouth Daily., Disp: 30 tablet, Rfl: 11    Wheat Dextrin (BENEFIBER DRINK MIX PO), Take  by mouth As Needed., Disp: , Rfl:         Objective:     There were no vitals filed for this visit.  There is no height or weight on file to calculate BMI.    PHYSICAL EXAM:    Constitutional:       General: Not in acute distress.     Appearance: Normal appearance. Well-developed.   Eyes:      Pupils: Pupils are equal, round, and reactive to light.   HENT:      Head: Normocephalic.   Neck:      Vascular: No carotid bruit or JVD.   Pulmonary:      Effort: Pulmonary effort is normal. No tachypnea.      Breath sounds: Normal breath sounds. No wheezing. No rales.   Cardiovascular:      Normal rate. Regular rhythm.      No gallop.    Pulses:     Intact distal pulses.   Edema:     Peripheral edema absent.    Abdominal:      General: Bowel sounds are normal.      Palpations: Abdomen is soft.      Tenderness: There is no abdominal tenderness.   Musculoskeletal: Normal range of motion.      Cervical back: Normal range of motion and neck supple. No edema. Skin:     General: Skin is warm and dry.   Neurological:      Mental Status: Alert and oriented to person, place, and time.           ECG 12 Lead    Date/Time: 6/17/2025 1:20 PM  Performed by: Cinthya Shelley APRN    Authorized by: Cinthya Shelley APRN  Comparison: compared with previous ECG from 3/24/2025  Similar to previous ECG  Rhythm: sinus rhythm  Rate: normal  Q waves: V2, V3 and V4    QRS axis: normal  Other findings: non-specific ST-T wave changes    Clinical impression: non-specific ECG        Lipid Panel          3/24/2025    12:59   Lipid Panel   Total Cholesterol 141    Triglycerides 118    HDL Cholesterol 40    VLDL Cholesterol 21    LDL Cholesterol  80    LDL/HDL Ratio 1.94          Assessment/Plan:      1.  Coronary artery disease-history of intervention to the right coronary in 2020 on monotherapy with clopidogrel 75 mg daily.  No angina symptoms EKG stable     2.  Peripheral edema-significant improvement.  I am actually cut his spironolactone back to 12.5 mg daily     3.  Essential hypertension-continue lisinopril 20 mg twice a day metoprolol XL 25 mg daily and spironolactone 25 mg daily.  Counseled him on sodium restriction     4.  Dyslipidemia-continue atorvastatin 40 mg daily lipids at goal     5.  Ongoing tobacco abuse.  Counseled patient on the risks of smoking and peripheral vascular disease as well as coronary disease.  He understands not ready to stop smoking at this time.  He also has had some unintentional weight loss.  Will set up for lung cancer screening     Follow-up as scheduled       Your medication list            Accurate as of June 17, 2025 12:06 PM. If you have any questions, ask your nurse or doctor.                CONTINUE  taking these medications        Instructions Last Dose Given Next Dose Due   atorvastatin 40 MG tablet  Commonly known as: LIPITOR      TAKE 1 TABLET BY MOUTH EVERY DAY       BENEFIBER DRINK MIX PO      Take  by mouth As Needed.       clopidogrel 75 MG tablet  Commonly known as: PLAVIX      TAKE 1 TABLET BY MOUTH EVERY DAY       lisinopril 20 MG tablet  Commonly known as: PRINIVIL,ZESTRIL      TAKE 1 TABLET BY MOUTH TWICE DAILY       metoprolol succinate XL 25 MG 24 hr tablet  Commonly known as: TOPROL-XL      TAKE 1 TABLET BY MOUTH EVERY DAY       Ryaltris 665-25 MCG/ACT suspension  Generic drug: Olopatadine-Mometasone           sennosides-docusate 8.6-50 MG per tablet  Commonly known as: PERICOLACE      Take 2 tablets by mouth Daily.       spironolactone 25 MG tablet  Commonly known as: ALDACTONE      Take 1 tablet by mouth Daily.                  As always, it has been a pleasure to participate in your patient's care.      Sincerely,     Cinthya BOWEN

## 2025-07-14 ENCOUNTER — HOSPITAL ENCOUNTER (OUTPATIENT)
Dept: CT IMAGING | Facility: HOSPITAL | Age: 70
Discharge: HOME | End: 2025-07-14
Admitting: NURSE PRACTITIONER
Payer: MEDICARE

## 2025-07-14 DIAGNOSIS — F17.210 NICOTINE DEPENDENCE, CIGARETTES, UNCOMPLICATED: ICD-10-CM

## 2025-07-14 DIAGNOSIS — Z71.6 TOBACCO ABUSE COUNSELING: ICD-10-CM

## 2025-07-14 DIAGNOSIS — Z87.891 PERSONAL HISTORY OF TOBACCO USE, PRESENTING HAZARDS TO HEALTH: ICD-10-CM

## 2025-07-14 PROCEDURE — 71271 CT THORAX LUNG CANCER SCR C-: CPT

## 2025-07-21 ENCOUNTER — RESULTS FOLLOW-UP (OUTPATIENT)
Age: 70
End: 2025-07-21
Payer: MEDICARE

## 2025-07-21 DIAGNOSIS — R91.8 MULTIPLE LUNG NODULES ON CT: Primary | ICD-10-CM

## 2025-07-21 NOTE — TELEPHONE ENCOUNTER
Spoke to patient and wife about CT scan results has a follow-up with vascular surgery regarding his abdominal aorta.  I am going to put in a referral to pulmonary given his multiple lung nodules and extensive smoking history.

## 2025-07-31 ENCOUNTER — HOSPITAL ENCOUNTER (OUTPATIENT)
Facility: HOSPITAL | Age: 70
Discharge: HOME | End: 2025-07-31
Payer: MEDICARE

## 2025-07-31 DIAGNOSIS — I71.41 PARARENAL ABDOMINAL AORTIC ANEURYSM (AAA) WITHOUT RUPTURE: ICD-10-CM

## 2025-07-31 DIAGNOSIS — I73.9 PERIPHERAL ARTERIAL DISEASE: ICD-10-CM

## 2025-07-31 LAB
BH CV LOWER ARTERIAL LEFT ABI RATIO: 0.67
BH CV LOWER ARTERIAL LEFT DORSALIS PEDIS SYS MAX: 94
BH CV LOWER ARTERIAL LEFT POST TIBIAL SYS MAX: 90
BH CV LOWER ARTERIAL RIGHT ABI RATIO: 1
BH CV LOWER ARTERIAL RIGHT DORSALIS PEDIS SYS MAX: 128
BH CV LOWER ARTERIAL RIGHT POST TIBIAL SYS MAX: 140
UPPER ARTERIAL LEFT ARM BRACHIAL SYS MAX: 140
UPPER ARTERIAL RIGHT ARM BRACHIAL SYS MAX: 128

## 2025-07-31 PROCEDURE — 93978 VASCULAR STUDY: CPT

## 2025-07-31 PROCEDURE — 93922 UPR/L XTREMITY ART 2 LEVELS: CPT

## 2025-08-01 RX ORDER — METOPROLOL SUCCINATE 25 MG/1
25 TABLET, EXTENDED RELEASE ORAL DAILY
Qty: 90 TABLET | Refills: 4 | Status: SHIPPED | OUTPATIENT
Start: 2025-08-01

## 2025-08-01 RX ORDER — ATORVASTATIN CALCIUM 40 MG/1
40 TABLET, FILM COATED ORAL DAILY
Qty: 90 TABLET | Refills: 4 | Status: SHIPPED | OUTPATIENT
Start: 2025-08-01

## 2025-08-03 LAB
ABDOMINAL DIST AORTA AP: 2.5 CM
ABDOMINAL DIST AORTA TRANS: 2.4 CM
ABDOMINAL DIST AORTA VEL: 44 CM/S
ABDOMINAL LT COM ILIAC AP: 1.2 CM
ABDOMINAL LT COM ILIAC TRANS: 1.2 CM
ABDOMINAL LT COM ILIAC VEL: 72 CM/S
ABDOMINAL LT EXT ILIAC VEL: 342 CM/S
ABDOMINAL LT INT ILIAC VEL: 287 CM/S
ABDOMINAL MID AORTA AP: 4 CM
ABDOMINAL MID AORTA TRANS: 4 CM
ABDOMINAL MID AORTA VEL: 45 CM/S
ABDOMINAL PROX AORTA AP: 2.4 CM
ABDOMINAL PROX AORTA TRANS: 2.4 CM
ABDOMINAL PROX AORTA VEL: 56 CM/S
ABDOMINAL RT COM ILIAC AP: 1.5 CM
ABDOMINAL RT COM ILIAC TRANS: 1.1 CM
ABDOMINAL RT COM ILIAC VEL: 73 CM/S
ABDOMINAL RT EXT ILIAC VEL: 165 CM/S
BH CV VAS ABD AO LT EXTERNAL ILIAC AP: 0.6 CM
BH CV VAS ABD AO RT EXTERNAL ILIAC AP: 1.1 CM
BH CV VAS ABDOMINAL AO RESIDUAL LUMEN AP MEASURE: 4 CM
BH CV VAS ABDOMINAL AO RESIDUAL LUMEN TRANS MEASURE: 4 CM

## 2025-08-07 ENCOUNTER — OFFICE VISIT (OUTPATIENT)
Age: 70
End: 2025-08-07
Payer: MEDICARE

## 2025-08-07 VITALS
SYSTOLIC BLOOD PRESSURE: 101 MMHG | BODY MASS INDEX: 16.39 KG/M2 | HEIGHT: 72 IN | RESPIRATION RATE: 16 BRPM | DIASTOLIC BLOOD PRESSURE: 66 MMHG | WEIGHT: 121 LBS

## 2025-08-07 DIAGNOSIS — I73.9 PERIPHERAL ARTERIAL DISEASE: ICD-10-CM

## 2025-08-07 DIAGNOSIS — I71.41 PARARENAL ABDOMINAL AORTIC ANEURYSM (AAA) WITHOUT RUPTURE: Primary | ICD-10-CM

## 2025-08-07 PROCEDURE — 3074F SYST BP LT 130 MM HG: CPT | Performed by: NURSE PRACTITIONER

## 2025-08-07 PROCEDURE — 99214 OFFICE O/P EST MOD 30 MIN: CPT | Performed by: NURSE PRACTITIONER

## 2025-08-07 PROCEDURE — 1160F RVW MEDS BY RX/DR IN RCRD: CPT | Performed by: NURSE PRACTITIONER

## 2025-08-07 PROCEDURE — 3078F DIAST BP <80 MM HG: CPT | Performed by: NURSE PRACTITIONER

## 2025-08-07 PROCEDURE — 1159F MED LIST DOCD IN RCRD: CPT | Performed by: NURSE PRACTITIONER

## (undated) DEVICE — TREK CORONARY DILATATION CATHETER 3.50 MM X 15 MM / RAPID-EXCHANGE: Brand: TREK

## (undated) DEVICE — NC TREK™ CORONARY DILATATION CATHETER 4.5 MM X 15 MM / RAPID-EXCHANGE: Brand: NC TREK™

## (undated) DEVICE — DEV INDEFLATOR

## (undated) DEVICE — CATH DIAG IMPULSE PIG 5F 100CM

## (undated) DEVICE — GW EMR FIX EXCHG J STD .035 3MM 260CM

## (undated) DEVICE — 6F .070 JR 4 100CM: Brand: CORDIS

## (undated) DEVICE — CATH DIAG IMPULSE FR5 5F 100CM

## (undated) DEVICE — GLIDESHEATH BASIC HYDROPHILIC COATED INTRODUCER SHEATH: Brand: GLIDESHEATH

## (undated) DEVICE — CATH DIAG IMPULSE FL3.5 5F 100CM

## (undated) DEVICE — RUNTHROUGH NS EXTRA FLOPPY PTCA GUIDEWIRE: Brand: RUNTHROUGH

## (undated) DEVICE — KT MANIFLD CARDIAC

## (undated) DEVICE — PK CATH CARD 40